# Patient Record
Sex: FEMALE | Race: WHITE | NOT HISPANIC OR LATINO | Employment: FULL TIME | URBAN - METROPOLITAN AREA
[De-identification: names, ages, dates, MRNs, and addresses within clinical notes are randomized per-mention and may not be internally consistent; named-entity substitution may affect disease eponyms.]

---

## 2018-08-20 ENCOUNTER — TRANSCRIBE ORDERS (OUTPATIENT)
Dept: ADMINISTRATIVE | Facility: HOSPITAL | Age: 43
End: 2018-08-20

## 2018-08-20 DIAGNOSIS — Z12.31 ENCOUNTER FOR SCREENING MAMMOGRAM FOR MALIGNANT NEOPLASM OF BREAST: Primary | ICD-10-CM

## 2018-08-27 ENCOUNTER — HOSPITAL ENCOUNTER (OUTPATIENT)
Dept: MAMMOGRAPHY | Facility: HOSPITAL | Age: 43
Discharge: HOME | End: 2018-08-27
Attending: SPECIALIST
Payer: COMMERCIAL

## 2018-08-27 DIAGNOSIS — Z12.31 ENCOUNTER FOR SCREENING MAMMOGRAM FOR MALIGNANT NEOPLASM OF BREAST: ICD-10-CM

## 2018-08-27 PROCEDURE — 77067 SCR MAMMO BI INCL CAD: CPT

## 2018-08-27 PROCEDURE — 77063 BREAST TOMOSYNTHESIS BI: CPT

## 2018-09-24 ENCOUNTER — TRANSCRIBE ORDERS (OUTPATIENT)
Dept: ADMINISTRATIVE | Facility: HOSPITAL | Age: 43
End: 2018-09-24

## 2018-09-24 DIAGNOSIS — R92.8 ABNORMAL MAMMOGRAM: Primary | ICD-10-CM

## 2018-10-09 ENCOUNTER — HOSPITAL ENCOUNTER (OUTPATIENT)
Dept: MAMMOGRAPHY | Facility: HOSPITAL | Age: 43
Discharge: HOME | End: 2018-10-09
Attending: SPECIALIST
Payer: COMMERCIAL

## 2018-10-09 DIAGNOSIS — R92.8 ABNORMAL MAMMOGRAM: ICD-10-CM

## 2018-10-09 PROCEDURE — G0279 TOMOSYNTHESIS, MAMMO: HCPCS

## 2018-10-09 PROCEDURE — 77065 DX MAMMO INCL CAD UNI: CPT

## 2020-06-04 ENCOUNTER — TRANSCRIBE ORDERS (OUTPATIENT)
Dept: ADMINISTRATIVE | Facility: HOSPITAL | Age: 45
End: 2020-06-04

## 2020-06-04 DIAGNOSIS — Z12.31 ENCOUNTER FOR SCREENING MAMMOGRAM FOR MALIGNANT NEOPLASM OF BREAST: Primary | ICD-10-CM

## 2020-06-09 ENCOUNTER — HOSPITAL ENCOUNTER (OUTPATIENT)
Dept: MAMMOGRAPHY | Facility: HOSPITAL | Age: 45
Discharge: HOME | End: 2020-06-09
Attending: SPECIALIST
Payer: COMMERCIAL

## 2020-06-09 VITALS — HEIGHT: 64 IN | BODY MASS INDEX: 29.88 KG/M2 | WEIGHT: 175 LBS

## 2020-06-09 DIAGNOSIS — Z12.31 ENCOUNTER FOR SCREENING MAMMOGRAM FOR MALIGNANT NEOPLASM OF BREAST: ICD-10-CM

## 2020-06-09 PROCEDURE — 77067 SCR MAMMO BI INCL CAD: CPT

## 2020-06-09 PROCEDURE — 77063 BREAST TOMOSYNTHESIS BI: CPT

## 2021-08-26 ENCOUNTER — HOSPITAL ENCOUNTER (OUTPATIENT)
Dept: MAMMOGRAPHY | Facility: HOSPITAL | Age: 46
Discharge: HOME | End: 2021-08-26
Attending: SPECIALIST
Payer: COMMERCIAL

## 2021-08-26 VITALS — HEIGHT: 64 IN | WEIGHT: 170 LBS | BODY MASS INDEX: 29.02 KG/M2

## 2021-08-26 DIAGNOSIS — Z12.31 ENCOUNTER FOR SCREENING MAMMOGRAM FOR MALIGNANT NEOPLASM OF BREAST: ICD-10-CM

## 2021-08-26 PROCEDURE — 77063 BREAST TOMOSYNTHESIS BI: CPT

## 2021-08-26 PROCEDURE — 77067 SCR MAMMO BI INCL CAD: CPT

## 2021-09-14 ENCOUNTER — HOSPITAL ENCOUNTER (OUTPATIENT)
Dept: ULTRASOUND IMAGING | Facility: HOSPITAL | Age: 46
Discharge: HOME | End: 2021-09-14
Attending: SPECIALIST
Payer: COMMERCIAL

## 2021-09-14 ENCOUNTER — HOSPITAL ENCOUNTER (OUTPATIENT)
Dept: MAMMOGRAPHY | Facility: HOSPITAL | Age: 46
Discharge: HOME | End: 2021-09-14
Attending: SPECIALIST
Payer: COMMERCIAL

## 2021-09-14 DIAGNOSIS — R92.8 ABNORMAL MAMMOGRAM: ICD-10-CM

## 2021-09-14 PROCEDURE — G0279 TOMOSYNTHESIS, MAMMO: HCPCS

## 2021-09-14 PROCEDURE — 77065 DX MAMMO INCL CAD UNI: CPT

## 2021-10-22 ENCOUNTER — APPOINTMENT (OUTPATIENT)
Dept: LAB | Facility: HOSPITAL | Age: 46
End: 2021-10-22
Attending: FAMILY MEDICINE
Payer: COMMERCIAL

## 2021-10-22 DIAGNOSIS — E78.5 HYPERLIPIDEMIA, UNSPECIFIED HYPERLIPIDEMIA TYPE: ICD-10-CM

## 2021-10-22 DIAGNOSIS — Z13.9 SCREENING FOR UNSPECIFIED CONDITION: ICD-10-CM

## 2021-10-22 LAB
25(OH)D3 SERPL-MCNC: 28.9 NG/ML (ref 30–100)
ALBUMIN SERPL BCP-MCNC: 3.9 G/DL (ref 3.5–5.7)
ALP SERPL-CCNC: 44 U/L (ref 40–150)
ALT SERPL W P-5'-P-CCNC: 10 U/L (ref 7–52)
ANION GAP SERPL CALCULATED.3IONS-SCNC: 10 MMOL/L (ref 4–13)
AST SERPL W P-5'-P-CCNC: 12 U/L (ref 13–39)
BILIRUB SERPL-MCNC: 0.6 MG/DL (ref 0.2–1)
BUN SERPL-MCNC: 9 MG/DL (ref 7–25)
CALCIUM SERPL-MCNC: 9.2 MG/DL (ref 8.6–10.5)
CHLORIDE SERPL-SCNC: 102 MMOL/L (ref 98–107)
CHOLEST SERPL-MCNC: 168 MG/DL (ref 0–200)
CO2 SERPL-SCNC: 25 MMOL/L (ref 21–31)
CREAT SERPL-MCNC: 0.73 MG/DL (ref 0.6–1.2)
ERYTHROCYTE [DISTWIDTH] IN BLOOD BY AUTOMATED COUNT: 11.8 % (ref 11.5–14.5)
GFR SERPL CREATININE-BSD FRML MDRD: 98 ML/MIN/1.73SQ M
GLUCOSE P FAST SERPL-MCNC: 86 MG/DL (ref 65–99)
HCT VFR BLD AUTO: 39.1 % (ref 42–47)
HDLC SERPL-MCNC: 75 MG/DL
HGB BLD-MCNC: 13.3 G/DL (ref 12–16)
LDLC SERPL CALC-MCNC: 68 MG/DL (ref 0–100)
MCH RBC QN AUTO: 34.1 PG (ref 26–34)
MCHC RBC AUTO-ENTMCNC: 34.1 G/DL (ref 31–37)
MCV RBC AUTO: 100 FL (ref 81–99)
NONHDLC SERPL-MCNC: 93 MG/DL
PLATELET # BLD AUTO: 289 THOUSANDS/UL (ref 149–390)
PMV BLD AUTO: 7.8 FL (ref 8.6–11.7)
POTASSIUM SERPL-SCNC: 4.2 MMOL/L (ref 3.5–5.5)
PROT SERPL-MCNC: 6.7 G/DL (ref 6.4–8.9)
RBC # BLD AUTO: 3.91 MILLION/UL (ref 3.9–5.2)
SODIUM SERPL-SCNC: 137 MMOL/L (ref 134–143)
TRIGL SERPL-MCNC: 123 MG/DL
WBC # BLD AUTO: 7.7 THOUSAND/UL (ref 4.8–10.8)

## 2021-10-22 PROCEDURE — 82306 VITAMIN D 25 HYDROXY: CPT

## 2021-10-22 PROCEDURE — 80061 LIPID PANEL: CPT

## 2021-10-22 PROCEDURE — 85027 COMPLETE CBC AUTOMATED: CPT

## 2021-10-22 PROCEDURE — 80053 COMPREHEN METABOLIC PANEL: CPT

## 2021-10-22 PROCEDURE — 36415 COLL VENOUS BLD VENIPUNCTURE: CPT

## 2022-12-08 ENCOUNTER — HOSPITAL ENCOUNTER (OUTPATIENT)
Dept: MAMMOGRAPHY | Facility: HOSPITAL | Age: 47
End: 2022-12-08
Attending: SPECIALIST

## 2022-12-08 VITALS — BODY MASS INDEX: 30.22 KG/M2 | WEIGHT: 177 LBS | HEIGHT: 64 IN

## 2022-12-08 DIAGNOSIS — Z12.31 ENCOUNTER FOR SCREENING MAMMOGRAM FOR MALIGNANT NEOPLASM OF BREAST: ICD-10-CM

## 2023-12-11 ENCOUNTER — HOSPITAL ENCOUNTER (OUTPATIENT)
Dept: MAMMOGRAPHY | Facility: HOSPITAL | Age: 48
Discharge: HOME | End: 2023-12-11
Attending: SPECIALIST
Payer: COMMERCIAL

## 2023-12-11 VITALS — BODY MASS INDEX: 30.22 KG/M2 | WEIGHT: 177 LBS | HEIGHT: 64 IN

## 2023-12-11 DIAGNOSIS — Z12.31 ENCOUNTER FOR SCREENING MAMMOGRAM FOR MALIGNANT NEOPLASM OF BREAST: ICD-10-CM

## 2023-12-11 PROCEDURE — 77067 SCR MAMMO BI INCL CAD: CPT

## 2023-12-11 PROCEDURE — 77063 BREAST TOMOSYNTHESIS BI: CPT

## 2023-12-22 ENCOUNTER — HOSPITAL ENCOUNTER (OUTPATIENT)
Dept: ULTRASOUND IMAGING | Facility: HOSPITAL | Age: 48
End: 2023-12-22
Attending: FAMILY MEDICINE
Payer: COMMERCIAL

## 2023-12-22 DIAGNOSIS — R10.9 ABDOMINAL PAIN, UNSPECIFIED ABDOMINAL LOCATION: ICD-10-CM

## 2023-12-22 PROCEDURE — 76705 ECHO EXAM OF ABDOMEN: CPT

## 2023-12-23 ENCOUNTER — APPOINTMENT (OUTPATIENT)
Dept: LAB | Facility: HOSPITAL | Age: 48
End: 2023-12-23
Payer: COMMERCIAL

## 2023-12-23 DIAGNOSIS — E78.5 HYPERLIPIDEMIA, UNSPECIFIED HYPERLIPIDEMIA TYPE: ICD-10-CM

## 2023-12-23 LAB
ALBUMIN SERPL BCP-MCNC: 3.7 G/DL (ref 3.5–5)
ALP SERPL-CCNC: 55 U/L (ref 34–104)
ALT SERPL W P-5'-P-CCNC: 13 U/L (ref 7–52)
ANION GAP SERPL CALCULATED.3IONS-SCNC: 11 MMOL/L
AST SERPL W P-5'-P-CCNC: 15 U/L (ref 13–39)
BILIRUB SERPL-MCNC: 0.7 MG/DL (ref 0.2–1)
BUN SERPL-MCNC: 12 MG/DL (ref 5–25)
CALCIUM SERPL-MCNC: 8.7 MG/DL (ref 8.4–10.2)
CHLORIDE SERPL-SCNC: 103 MMOL/L (ref 96–108)
CHOLEST SERPL-MCNC: 174 MG/DL
CO2 SERPL-SCNC: 22 MMOL/L (ref 21–32)
CREAT SERPL-MCNC: 0.77 MG/DL (ref 0.6–1.3)
ERYTHROCYTE [DISTWIDTH] IN BLOOD BY AUTOMATED COUNT: 11.4 % (ref 11.6–15.1)
GFR SERPL CREATININE-BSD FRML MDRD: 90 ML/MIN/1.73SQ M
GLUCOSE P FAST SERPL-MCNC: 75 MG/DL (ref 65–99)
HCT VFR BLD AUTO: 37 % (ref 34.8–46.1)
HDLC SERPL-MCNC: 62 MG/DL
HGB BLD-MCNC: 12.7 G/DL (ref 11.5–15.4)
LDLC SERPL CALC-MCNC: 79 MG/DL (ref 0–100)
MCH RBC QN AUTO: 34.4 PG (ref 26.8–34.3)
MCHC RBC AUTO-ENTMCNC: 34.3 G/DL (ref 31.4–37.4)
MCV RBC AUTO: 100 FL (ref 82–98)
NONHDLC SERPL-MCNC: 112 MG/DL
PLATELET # BLD AUTO: 293 THOUSANDS/UL (ref 149–390)
PMV BLD AUTO: 9.2 FL (ref 8.9–12.7)
POTASSIUM SERPL-SCNC: 3.8 MMOL/L (ref 3.5–5.3)
PROT SERPL-MCNC: 6.4 G/DL (ref 6.4–8.4)
RBC # BLD AUTO: 3.69 MILLION/UL (ref 3.81–5.12)
SODIUM SERPL-SCNC: 136 MMOL/L (ref 135–147)
TRIGL SERPL-MCNC: 166 MG/DL
WBC # BLD AUTO: 10.56 THOUSAND/UL (ref 4.31–10.16)

## 2023-12-23 PROCEDURE — 80053 COMPREHEN METABOLIC PANEL: CPT

## 2023-12-23 PROCEDURE — 36415 COLL VENOUS BLD VENIPUNCTURE: CPT

## 2023-12-23 PROCEDURE — 80061 LIPID PANEL: CPT

## 2023-12-23 PROCEDURE — 85027 COMPLETE CBC AUTOMATED: CPT

## 2024-01-11 ENCOUNTER — CONSULT (OUTPATIENT)
Dept: SURGERY | Facility: CLINIC | Age: 49
End: 2024-01-11
Payer: COMMERCIAL

## 2024-01-11 ENCOUNTER — CONSULT (OUTPATIENT)
Dept: GASTROENTEROLOGY | Facility: CLINIC | Age: 49
End: 2024-01-11
Payer: COMMERCIAL

## 2024-01-11 VITALS
OXYGEN SATURATION: 98 % | SYSTOLIC BLOOD PRESSURE: 122 MMHG | HEIGHT: 64 IN | DIASTOLIC BLOOD PRESSURE: 78 MMHG | BODY MASS INDEX: 32.85 KG/M2 | WEIGHT: 192.4 LBS | HEART RATE: 85 BPM | TEMPERATURE: 97.5 F | RESPIRATION RATE: 16 BRPM

## 2024-01-11 VITALS
RESPIRATION RATE: 18 BRPM | WEIGHT: 192 LBS | HEART RATE: 85 BPM | BODY MASS INDEX: 32.78 KG/M2 | HEIGHT: 64 IN | DIASTOLIC BLOOD PRESSURE: 78 MMHG | OXYGEN SATURATION: 98 % | SYSTOLIC BLOOD PRESSURE: 122 MMHG

## 2024-01-11 DIAGNOSIS — K80.20 GALLSTONE: Primary | ICD-10-CM

## 2024-01-11 DIAGNOSIS — K80.20 CALCULUS OF GALLBLADDER WITHOUT CHOLECYSTITIS WITHOUT OBSTRUCTION: ICD-10-CM

## 2024-01-11 DIAGNOSIS — R10.13 EPIGASTRIC PAIN: ICD-10-CM

## 2024-01-11 DIAGNOSIS — R11.0 NAUSEA: Primary | ICD-10-CM

## 2024-01-11 DIAGNOSIS — Z12.11 SCREENING FOR COLON CANCER: ICD-10-CM

## 2024-01-11 DIAGNOSIS — K76.0 HEPATIC STEATOSIS: ICD-10-CM

## 2024-01-11 DIAGNOSIS — R11.0 NAUSEA: ICD-10-CM

## 2024-01-11 PROCEDURE — 99204 OFFICE O/P NEW MOD 45 MIN: CPT | Performed by: PHYSICIAN ASSISTANT

## 2024-01-11 PROCEDURE — 99204 OFFICE O/P NEW MOD 45 MIN: CPT | Performed by: SURGERY

## 2024-01-11 RX ORDER — FAMOTIDINE 20 MG/1
20 TABLET, FILM COATED ORAL 2 TIMES DAILY
Qty: 60 TABLET | Refills: 5 | Status: SHIPPED | OUTPATIENT
Start: 2024-01-11

## 2024-01-11 RX ORDER — ATORVASTATIN CALCIUM 20 MG/1
TABLET, FILM COATED ORAL
COMMUNITY
Start: 2023-11-25

## 2024-01-11 RX ORDER — NORGESTIMATE AND ETHINYL ESTRADIOL 7DAYSX3 LO
KIT ORAL
COMMUNITY
Start: 2018-09-10

## 2024-01-11 NOTE — PROGRESS NOTES
[facility] - Outpatient Consultation  Philly Jeffrey 48 y.o. female MRN: [id number]  Encounter: [id number]    ASSESSMENT AND PLAN:      1. Nausea  2. Epigastric pain    This pt developed recurrent nausea over the past 6+ months without obvious precipitant.     She has a history of heartburn though her symptoms are quiescent at present.  She had an ultrasound which demonstrated incidental findings of gallstones.      We did review at this time differential diagnosis includes gastritis, H. pylori infection, gastroparesis, gout but dysbiosis, irritable bowel syndrome, functional dyspepsia, biliary pathology, though symptoms are not typical for biliary colic.    Reviewed given no improvement with PPI, recommend additional investigation with EGD now to evaluate for intra-luminal pathology.   Recommend diet and lifestyle modifications for GERD.   Recommend small frequent meals and avoiding saturated fats and difficult to digest raw fibrous foods.   Consider GES in f/u.     Risks associated with endoscopic evaluation discussed with patient today, including but not limited to bleeding, infection, perforation, and organ injury, all of which are low. Pt demonstrates understanding and is agreeable to the plan.     - Ambulatory Referral to Gastroenterology  - EGD; Future  - TSH w/Reflex; Future  - Tissue transglutaminase, IgA; Future  - IgA; Future  - famotidine (PEPCID) 20 mg tablet; Take 1 tablet (20 mg total) by mouth 2 (two) times a day  Dispense: 60 tablet; Refill: 5    3. Calculus of gallbladder without cholecystitis without obstruction    Patient had ultrasound completed for nausea which demonstrated incidental findings of gallstones.  She does not have any symptoms at present that are consistent with typical biliary dyskinesia.  Recommend a comprehensive GI evaluation and if unremarkable consider HIDA scan with CCK for additional evaluation. She may have underlying biliary dyskinesia  (?).    - Ambulatory Referral to Gastroenterology    4. Screening for colon cancer    Due for colonoscopy for cancer screening purposes.  Patient is of average risk, no first-degree relative with colorectal cancer.  No alarm features to the lower GI tract at this time.  MiraLAX Dulcolax Gatorade prep was prescribed today.    - Colonoscopy; Future    5. Hepatic steatosis    Noted, suspect combination of metabolic/NAFLD as well as a component of EtOH related as she does consume 1-2 beers daily.  Reviewed possible sequelae of disease.  Fibrosis score is low, F0 to F2.  Recommend cutting back on alcohol, slow sustained weight loss.  Consider elastography in the future.    Discussed recommendations in regards to fatty liver including:   Strict control of contributing comorbidities (obesity, prediabetes/diabetes, hypertension, and hypertriglyceridemia).  Weight loss of approx 10-15% of patient's current body weight over a period of 6-12 months through low fat diet and cardiovascular exercise as tolerated.  Limiting alcohol consumption, preferably complete abstinence.  Monitor hepatic function every 6 months with routine labs.     - Chronic Hepatitis Panel; Future  - Hepatitis A antibody, total; Future  - Hepatitis B surface antibody; Future    NAFLD Fibrosis Score is: -1.872    NAFLD Score Correlated Fibrosis Severity   <-1.455 F0-F2   -1.455-0.676 Indeterminate Score   >0.676 F3-F4   **Fibrosis Severity Scale: F0 = no fibrosis; F1= mild fibrosis; F2 = moderate fibrosis; F3 = severe fibrosis; F4 = cirrhosis    NAFLD Score Component Values:  Component Value Date   Age: 48 y.o.     BMI: 32.96 kg/m²    IFG or DM: No    AST: 15 U/L 12/23/2023   ALT: 13 U/L 12/23/2023   Platelet: 293 Thousands/uL 12/23/2023   Albumin: 3.7 g/dL 12/23/2023      Follow up after bidirectional endoscopic evaluation.  ______________________________________________________________________    HPI: Patient is a 48 y.o. female who presents today for a  consultation regarding nausea. Pmhx sig for hepatic steatosis, HLD.     Patient is new to clinic.  She shares that back in 12/2022 she developed epigastric pain and her PCP started her on PPI. This resolved symptoms and she d/c medication and was symptom free for about 6 months. She shares that in the summer of 2023 she started to develop episodic nausea with no obvious precipitants. No obvious infection, change in medication, fever, new supplements, dietary changes.     Pt shares that after onset of nausea, she tried PPI once again for 2-3 weeks and this did not help symptoms so she d/c. Pt shares nausea occurs daily, typically 2-3 times per day. Can last for minutes to several hours. Waxes and wanes. Thinks perhaps worse following fatty foods, otherwise difficult to identify specific triggers. She did have one bout of non-bloody emesis that lasted for approx 5 hours, thought this was 2/2 to GI bug. No hematemesis. Rare heartburn depending on oral intake. No dysphagia or odynophagia. No early satiety. No unintentional weight loss.  No new headache, vision change, vertigo.    Pertaining to her bowels, patient is having a formed brown bowel movement every other day or so.  This has been her pattern chronically.  No excess straining or sense of incomplete evacuation.  No abdominal pain or rectal pain related to defecation.  Denies any BRBPR or melena. No nocturnal BM. No family hx of CRC.     NSAIDs: none   Etoh: 1-2 cans euceda lite daily  Tobacco: none     12/2023: RUQ US: Cholelithiasis. No evidence of acute cholecystitis. Borderline hepatomegaly with diffuse fatty infiltration.  12/2023: Hb 12.7, , Plt 293, BUN 12, Cr 0.77, AST 15, ALT 13, ALP 55, albumin 3.7, t bili 0.70     Endoscopic history:   EGD: none   Colon: none     Review of Systems   Constitutional:  Negative for fever.   Gastrointestinal:  Positive for abdominal pain and nausea. Negative for constipation, diarrhea and vomiting.   Genitourinary:   "Negative for dysuria, frequency and hematuria.   Musculoskeletal:  Negative for arthralgias and myalgias.   Neurological:  Negative for headaches.   Otherwise Per HPI    Historical Information   Past Medical History:   Diagnosis Date    Breast mass 2011     Past Surgical History:   Procedure Laterality Date    BREAST CYST EXCISION Right 2011    benign     Social History   Social History     Substance and Sexual Activity   Alcohol Use Yes    Alcohol/week: 10.0 standard drinks of alcohol    Types: 10 Cans of beer per week     Social History     Substance and Sexual Activity   Drug Use Never     Social History     Tobacco Use   Smoking Status Former    Current packs/day: 0.00    Average packs/day: 0.5 packs/day for 16.5 years (8.3 ttl pk-yrs)    Types: Cigarettes    Start date: 1992    Quit date: 3/3/2009    Years since quittin.8   Smokeless Tobacco Never     Family History   Problem Relation Age of Onset    No Known Problems Mother     No Known Problems Father     No Known Problems Daughter     Breast cancer Maternal Grandmother 83    No Known Problems Maternal Grandfather     No Known Problems Paternal Grandmother     No Known Problems Paternal Grandfather     No Known Problems Maternal Aunt     No Known Problems Maternal Aunt     No Known Problems Maternal Aunt     No Known Problems Paternal Aunt     No Known Problems Paternal Aunt     BRCA2 Positive Neg Hx     BRCA2 Negative Neg Hx     BRCA1 Positive Neg Hx     Ovarian cancer Neg Hx     BRCA1 Negative Neg Hx     Breast cancer additional onset Neg Hx     BRCA 1/2 Neg Hx     Colon cancer Neg Hx     Endometrial cancer Neg Hx      Meds/Allergies       Current Outpatient Medications:     atorvastatin (LIPITOR) 20 mg tablet    famotidine (PEPCID) 20 mg tablet    norgestimate-ethinyl estradiol (Ortho Tri-Cyclen Lo) 0.18/0.215/0.25 MG-25 MCG per tablet    No Known Allergies    Objective     Blood pressure 122/78, pulse 85, resp. rate 18, height 5' 4\" " (1.626 m), weight 87.1 kg (192 lb), SpO2 98%. Body mass index is 32.96 kg/m².    Physical Exam  Vitals and nursing note reviewed.   Constitutional:       Appearance: Normal appearance.   HENT:      Head: Normocephalic and atraumatic.   Eyes:      General: No scleral icterus.     Conjunctiva/sclera: Conjunctivae normal.   Pulmonary:      Effort: Pulmonary effort is normal. No respiratory distress.   Abdominal:      General: Bowel sounds are normal. There is no distension.      Palpations: Abdomen is soft.      Tenderness: There is no abdominal tenderness. There is no guarding or rebound.   Musculoskeletal:      Right lower leg: No edema.      Left lower leg: No edema.   Skin:     General: Skin is warm and dry.      Coloration: Skin is not jaundiced.   Neurological:      General: No focal deficit present.      Mental Status: She is alert and oriented to person, place, and time.   Psychiatric:         Mood and Affect: Mood normal.         Behavior: Behavior normal.        Lab Results:   No visits with results within 1 Day(s) from this visit.   Latest known visit with results is:   Appointment on 12/23/2023   Component Date Value    WBC 12/23/2023 10.56 (H)     RBC 12/23/2023 3.69 (L)     Hemoglobin 12/23/2023 12.7     Hematocrit 12/23/2023 37.0     MCV 12/23/2023 100 (H)     MCH 12/23/2023 34.4 (H)     MCHC 12/23/2023 34.3     RDW 12/23/2023 11.4 (L)     Platelets 12/23/2023 293     MPV 12/23/2023 9.2     Sodium 12/23/2023 136     Potassium 12/23/2023 3.8     Chloride 12/23/2023 103     CO2 12/23/2023 22     ANION GAP 12/23/2023 11     BUN 12/23/2023 12     Creatinine 12/23/2023 0.77     Glucose, Fasting 12/23/2023 75     Calcium 12/23/2023 8.7     AST 12/23/2023 15     ALT 12/23/2023 13     Alkaline Phosphatase 12/23/2023 55     Total Protein 12/23/2023 6.4     Albumin 12/23/2023 3.7     Total Bilirubin 12/23/2023 0.70     eGFR 12/23/2023 90     Cholesterol 12/23/2023 174     Triglycerides 12/23/2023 166 (H)     HDL,  Direct 12/23/2023 62     LDL Calculated 12/23/2023 79     Non-HDL-Chol (CHOL-HDL) 12/23/2023 112      Radiology Results:   US right upper quadrant    Result Date: 1/1/2024  Narrative: RIGHT UPPER QUADRANT ULTRASOUND INDICATION:     R10.9: Unspecified abdominal pain. COMPARISON:  None TECHNIQUE:   Real-time ultrasound of the right upper quadrant was performed with a curvilinear transducer with both volumetric sweeps and still imaging techniques. FINDINGS: PANCREAS:  Visualized portions of the pancreas are within normal limits. AORTA AND IVC:  Visualized portions are normal for patient age. LIVER: Size: Borderline hepatomegaly.  The liver measures 17.4 cm in the midclavicular line. Contour:  Surface contour is smooth. Parenchyma:  There is mild diffuse increased echogenicity with smooth echotexture, without significant beam attenuation or loss of periportal echogenicity. No liver mass identified. Limited imaging of the main portal vein shows it to be patent and hepatopetal. BILIARY: The gallbladder is normal in caliber. No wall thickening or pericholecystic fluid. Shadowing gallstone(s) identified. No sonographic Cruz's sign. No intrahepatic biliary dilatation. CBD measures 4.0 mm. No choledocholithiasis. KIDNEY: Right kidney measures 10.0 x 3.6 x 4.1 cm. Volume 78.0 mL Kidney within normal limits. ASCITES:   None.     Impression: Cholelithiasis. No evidence of acute cholecystitis. Borderline hepatomegaly with diffuse fatty infiltration. Workstation performed: [id number]     Renee Bear PA-C    **Please note:  Dictation voice to text software may have been used in the creation of this record.  Occasional wrong word or “sound alike” substitutions may have occurred due to the inherent limitations of voice recognition software.  Read the chart carefully and recognize, using context, where substitutions have occurred.**

## 2024-01-11 NOTE — H&P (VIEW-ONLY)
[facility] - Outpatient Consultation  Philly Jeffrey 48 y.o. female MRN: [id number]  Encounter: [id number]    ASSESSMENT AND PLAN:      1. Nausea  2. Epigastric pain    This pt developed recurrent nausea over the past 6+ months without obvious precipitant.     She has a history of heartburn though her symptoms are quiescent at present.  She had an ultrasound which demonstrated incidental findings of gallstones.      We did review at this time differential diagnosis includes gastritis, H. pylori infection, gastroparesis, gout but dysbiosis, irritable bowel syndrome, functional dyspepsia, biliary pathology, though symptoms are not typical for biliary colic.    Reviewed given no improvement with PPI, recommend additional investigation with EGD now to evaluate for intra-luminal pathology.   Recommend diet and lifestyle modifications for GERD.   Recommend small frequent meals and avoiding saturated fats and difficult to digest raw fibrous foods.   Consider GES in f/u.     Risks associated with endoscopic evaluation discussed with patient today, including but not limited to bleeding, infection, perforation, and organ injury, all of which are low. Pt demonstrates understanding and is agreeable to the plan.     - Ambulatory Referral to Gastroenterology  - EGD; Future  - TSH w/Reflex; Future  - Tissue transglutaminase, IgA; Future  - IgA; Future  - famotidine (PEPCID) 20 mg tablet; Take 1 tablet (20 mg total) by mouth 2 (two) times a day  Dispense: 60 tablet; Refill: 5    3. Calculus of gallbladder without cholecystitis without obstruction    Patient had ultrasound completed for nausea which demonstrated incidental findings of gallstones.  She does not have any symptoms at present that are consistent with typical biliary dyskinesia.  Recommend a comprehensive GI evaluation and if unremarkable consider HIDA scan with CCK for additional evaluation. She may have underlying biliary dyskinesia  (?).    - Ambulatory Referral to Gastroenterology    4. Screening for colon cancer    Due for colonoscopy for cancer screening purposes.  Patient is of average risk, no first-degree relative with colorectal cancer.  No alarm features to the lower GI tract at this time.  MiraLAX Dulcolax Gatorade prep was prescribed today.    - Colonoscopy; Future    5. Hepatic steatosis    Noted, suspect combination of metabolic/NAFLD as well as a component of EtOH related as she does consume 1-2 beers daily.  Reviewed possible sequelae of disease.  Fibrosis score is low, F0 to F2.  Recommend cutting back on alcohol, slow sustained weight loss.  Consider elastography in the future.    Discussed recommendations in regards to fatty liver including:   Strict control of contributing comorbidities (obesity, prediabetes/diabetes, hypertension, and hypertriglyceridemia).  Weight loss of approx 10-15% of patient's current body weight over a period of 6-12 months through low fat diet and cardiovascular exercise as tolerated.  Limiting alcohol consumption, preferably complete abstinence.  Monitor hepatic function every 6 months with routine labs.     - Chronic Hepatitis Panel; Future  - Hepatitis A antibody, total; Future  - Hepatitis B surface antibody; Future    NAFLD Fibrosis Score is: -1.872    NAFLD Score Correlated Fibrosis Severity   <-1.455 F0-F2   -1.455-0.676 Indeterminate Score   >0.676 F3-F4   **Fibrosis Severity Scale: F0 = no fibrosis; F1= mild fibrosis; F2 = moderate fibrosis; F3 = severe fibrosis; F4 = cirrhosis    NAFLD Score Component Values:  Component Value Date   Age: 48 y.o.     BMI: 32.96 kg/m²    IFG or DM: No    AST: 15 U/L 12/23/2023   ALT: 13 U/L 12/23/2023   Platelet: 293 Thousands/uL 12/23/2023   Albumin: 3.7 g/dL 12/23/2023      Follow up after bidirectional endoscopic evaluation.  ______________________________________________________________________    HPI: Patient is a 48 y.o. female who presents today for a  consultation regarding nausea. Pmhx sig for hepatic steatosis, HLD.     Patient is new to clinic.  She shares that back in 12/2022 she developed epigastric pain and her PCP started her on PPI. This resolved symptoms and she d/c medication and was symptom free for about 6 months. She shares that in the summer of 2023 she started to develop episodic nausea with no obvious precipitants. No obvious infection, change in medication, fever, new supplements, dietary changes.     Pt shares that after onset of nausea, she tried PPI once again for 2-3 weeks and this did not help symptoms so she d/c. Pt shares nausea occurs daily, typically 2-3 times per day. Can last for minutes to several hours. Waxes and wanes. Thinks perhaps worse following fatty foods, otherwise difficult to identify specific triggers. She did have one bout of non-bloody emesis that lasted for approx 5 hours, thought this was 2/2 to GI bug. No hematemesis. Rare heartburn depending on oral intake. No dysphagia or odynophagia. No early satiety. No unintentional weight loss.  No new headache, vision change, vertigo.    Pertaining to her bowels, patient is having a formed brown bowel movement every other day or so.  This has been her pattern chronically.  No excess straining or sense of incomplete evacuation.  No abdominal pain or rectal pain related to defecation.  Denies any BRBPR or melena. No nocturnal BM. No family hx of CRC.     NSAIDs: none   Etoh: 1-2 cans euceda lite daily  Tobacco: none     12/2023: RUQ US: Cholelithiasis. No evidence of acute cholecystitis. Borderline hepatomegaly with diffuse fatty infiltration.  12/2023: Hb 12.7, , Plt 293, BUN 12, Cr 0.77, AST 15, ALT 13, ALP 55, albumin 3.7, t bili 0.70     Endoscopic history:   EGD: none   Colon: none     Review of Systems   Constitutional:  Negative for fever.   Gastrointestinal:  Positive for abdominal pain and nausea. Negative for constipation, diarrhea and vomiting.   Genitourinary:   "Negative for dysuria, frequency and hematuria.   Musculoskeletal:  Negative for arthralgias and myalgias.   Neurological:  Negative for headaches.   Otherwise Per HPI    Historical Information   Past Medical History:   Diagnosis Date    Breast mass 2011     Past Surgical History:   Procedure Laterality Date    BREAST CYST EXCISION Right 2011    benign     Social History   Social History     Substance and Sexual Activity   Alcohol Use Yes    Alcohol/week: 10.0 standard drinks of alcohol    Types: 10 Cans of beer per week     Social History     Substance and Sexual Activity   Drug Use Never     Social History     Tobacco Use   Smoking Status Former    Current packs/day: 0.00    Average packs/day: 0.5 packs/day for 16.5 years (8.3 ttl pk-yrs)    Types: Cigarettes    Start date: 1992    Quit date: 3/3/2009    Years since quittin.8   Smokeless Tobacco Never     Family History   Problem Relation Age of Onset    No Known Problems Mother     No Known Problems Father     No Known Problems Daughter     Breast cancer Maternal Grandmother 83    No Known Problems Maternal Grandfather     No Known Problems Paternal Grandmother     No Known Problems Paternal Grandfather     No Known Problems Maternal Aunt     No Known Problems Maternal Aunt     No Known Problems Maternal Aunt     No Known Problems Paternal Aunt     No Known Problems Paternal Aunt     BRCA2 Positive Neg Hx     BRCA2 Negative Neg Hx     BRCA1 Positive Neg Hx     Ovarian cancer Neg Hx     BRCA1 Negative Neg Hx     Breast cancer additional onset Neg Hx     BRCA 1/2 Neg Hx     Colon cancer Neg Hx     Endometrial cancer Neg Hx      Meds/Allergies       Current Outpatient Medications:     atorvastatin (LIPITOR) 20 mg tablet    famotidine (PEPCID) 20 mg tablet    norgestimate-ethinyl estradiol (Ortho Tri-Cyclen Lo) 0.18/0.215/0.25 MG-25 MCG per tablet    No Known Allergies    Objective     Blood pressure 122/78, pulse 85, resp. rate 18, height 5' 4\" " (1.626 m), weight 87.1 kg (192 lb), SpO2 98%. Body mass index is 32.96 kg/m².    Physical Exam  Vitals and nursing note reviewed.   Constitutional:       Appearance: Normal appearance.   HENT:      Head: Normocephalic and atraumatic.   Eyes:      General: No scleral icterus.     Conjunctiva/sclera: Conjunctivae normal.   Pulmonary:      Effort: Pulmonary effort is normal. No respiratory distress.   Abdominal:      General: Bowel sounds are normal. There is no distension.      Palpations: Abdomen is soft.      Tenderness: There is no abdominal tenderness. There is no guarding or rebound.   Musculoskeletal:      Right lower leg: No edema.      Left lower leg: No edema.   Skin:     General: Skin is warm and dry.      Coloration: Skin is not jaundiced.   Neurological:      General: No focal deficit present.      Mental Status: She is alert and oriented to person, place, and time.   Psychiatric:         Mood and Affect: Mood normal.         Behavior: Behavior normal.        Lab Results:   No visits with results within 1 Day(s) from this visit.   Latest known visit with results is:   Appointment on 12/23/2023   Component Date Value    WBC 12/23/2023 10.56 (H)     RBC 12/23/2023 3.69 (L)     Hemoglobin 12/23/2023 12.7     Hematocrit 12/23/2023 37.0     MCV 12/23/2023 100 (H)     MCH 12/23/2023 34.4 (H)     MCHC 12/23/2023 34.3     RDW 12/23/2023 11.4 (L)     Platelets 12/23/2023 293     MPV 12/23/2023 9.2     Sodium 12/23/2023 136     Potassium 12/23/2023 3.8     Chloride 12/23/2023 103     CO2 12/23/2023 22     ANION GAP 12/23/2023 11     BUN 12/23/2023 12     Creatinine 12/23/2023 0.77     Glucose, Fasting 12/23/2023 75     Calcium 12/23/2023 8.7     AST 12/23/2023 15     ALT 12/23/2023 13     Alkaline Phosphatase 12/23/2023 55     Total Protein 12/23/2023 6.4     Albumin 12/23/2023 3.7     Total Bilirubin 12/23/2023 0.70     eGFR 12/23/2023 90     Cholesterol 12/23/2023 174     Triglycerides 12/23/2023 166 (H)     HDL,  Direct 12/23/2023 62     LDL Calculated 12/23/2023 79     Non-HDL-Chol (CHOL-HDL) 12/23/2023 112      Radiology Results:   US right upper quadrant    Result Date: 1/1/2024  Narrative: RIGHT UPPER QUADRANT ULTRASOUND INDICATION:     R10.9: Unspecified abdominal pain. COMPARISON:  None TECHNIQUE:   Real-time ultrasound of the right upper quadrant was performed with a curvilinear transducer with both volumetric sweeps and still imaging techniques. FINDINGS: PANCREAS:  Visualized portions of the pancreas are within normal limits. AORTA AND IVC:  Visualized portions are normal for patient age. LIVER: Size: Borderline hepatomegaly.  The liver measures 17.4 cm in the midclavicular line. Contour:  Surface contour is smooth. Parenchyma:  There is mild diffuse increased echogenicity with smooth echotexture, without significant beam attenuation or loss of periportal echogenicity. No liver mass identified. Limited imaging of the main portal vein shows it to be patent and hepatopetal. BILIARY: The gallbladder is normal in caliber. No wall thickening or pericholecystic fluid. Shadowing gallstone(s) identified. No sonographic Cruz's sign. No intrahepatic biliary dilatation. CBD measures 4.0 mm. No choledocholithiasis. KIDNEY: Right kidney measures 10.0 x 3.6 x 4.1 cm. Volume 78.0 mL Kidney within normal limits. ASCITES:   None.     Impression: Cholelithiasis. No evidence of acute cholecystitis. Borderline hepatomegaly with diffuse fatty infiltration. Workstation performed: [id number]     Renee Bear PA-C    **Please note:  Dictation voice to text software may have been used in the creation of this record.  Occasional wrong word or “sound alike” substitutions may have occurred due to the inherent limitations of voice recognition software.  Read the chart carefully and recognize, using context, where substitutions have occurred.**

## 2024-01-11 NOTE — PROGRESS NOTES
Consult - General Surgery   Philly Jeffrey 48 y.o. female MRN: [id number]  Encounter: [id number]    Assessment/Plan     48F with 3-6 months of intermittent nausea, mostly postprandial, found to have gallstones and hepatic steatosis on US without signs of inflammation. No classic signs and symptoms of biliary colic based on history and physical. Patient does consume at least 1 alcoholic drink per day. Mentions no significant GERD, no improvement in nausea with 2-3 weeks of daily PPI use, so now has stopped taking it. I advised having the patient evaluated by our GI colleagues with consideration for EGD to further evaluate her symptoms and look for gastritis, duodenitis, h.pylori or other UGI pathology. Would also advise colonoscopy for colon cancer screening at the same time. Will defer to GI regarding additional appropriate workup. Should the patient remain symptomatic and without an obvious cause, would consider HIDA with CCK testing to evaluate for biliary dyskinesia as at times nausea can be one of the primary symptoms of that condition. At this point, I do not believe the patient to have true biliary colic as abdominal pain has not been an issue. Will continue to follow along with her GI evaluation and testing and remain available to reconsider cholecystectomy as appropriate. The patient is in agreement with our working plan. We have been able to schedule her with our GI team later today.      History of Present Illness   Chief Complaint   Patient presents with   • Calculus of bile     3-6 months of nausea, intermittent, mostly postprandial. One episode of 5 hours of vomiting, but usually no vomiting. No diarrhea, no constipation, no bloody or black stools. No abdominal pain. Nausea is worst after eating and after eating fatty foods but it has not decreased her appetite or led her to decrease PO intake. Tried PPI for 2-3 weeks without improvement and then stopped it. Drinks a beer each day, sometimes more  on the weekends, this is at the end of the day. Prior smoker, quit 15 years ago, denies heavy NSAID use. No prior abdominal surgeries. Mother has gastric polyps and gets EGD testing. Patient never had prior EGD/colonoscopy, was initially planning for screening colonoscopy this summer after turning 49.       Review of Systems   Constitutional: Negative.  Negative for activity change, appetite change and fever.   Gastrointestinal:  Positive for nausea and vomiting. Negative for abdominal distention, abdominal pain, blood in stool, constipation and diarrhea.   Genitourinary:  Negative for difficulty urinating.   All other systems reviewed and are negative.      Historical Information   Past Medical History:   Diagnosis Date   • Breast mass 2011     Past Surgical History:   Procedure Laterality Date   • BREAST CYST EXCISION Right 2011    benign     Social History   Social History     Substance and Sexual Activity   Alcohol Use Yes   • Alcohol/week: 10.0 standard drinks of alcohol   • Types: 10 Cans of beer per week     Social History     Substance and Sexual Activity   Drug Use Never     Social History     Tobacco Use   Smoking Status Former   • Current packs/day: 0.00   • Average packs/day: 0.5 packs/day for 16.5 years (8.3 ttl pk-yrs)   • Types: Cigarettes   • Start date: 1992   • Quit date: 3/3/2009   • Years since quittin.8   Smokeless Tobacco Never     Family History   Problem Relation Age of Onset   • No Known Problems Mother    • No Known Problems Father    • No Known Problems Daughter    • Breast cancer Maternal Grandmother 83   • No Known Problems Maternal Grandfather    • No Known Problems Paternal Grandmother    • No Known Problems Paternal Grandfather    • No Known Problems Maternal Aunt    • No Known Problems Maternal Aunt    • No Known Problems Maternal Aunt    • No Known Problems Paternal Aunt    • No Known Problems Paternal Aunt    • BRCA2 Positive Neg Hx    • BRCA2 Negative Neg Hx    •  "BRCA1 Positive Neg Hx    • Ovarian cancer Neg Hx    • BRCA1 Negative Neg Hx    • Breast cancer additional onset Neg Hx    • BRCA 1/2 Neg Hx    • Colon cancer Neg Hx    • Endometrial cancer Neg Hx        Meds/Allergies     Current Outpatient Medications:   •  atorvastatin (LIPITOR) 20 mg tablet, , Disp: , Rfl:   •  norgestimate-ethinyl estradiol (Ortho Tri-Cyclen Lo) 0.18/0.215/0.25 MG-25 MCG per tablet, , Disp: , Rfl:   No Known Allergies    The following portions of the patient's history were reviewed and updated as appropriate: allergies, current medications, past family history, past medical history, past social history, past surgical history, and problem list.    Objective   Current Vitals:   Blood pressure 122/78, pulse 85, temperature 97.5 °F (36.4 °C), temperature source Temporal, resp. rate 16, height 5' 4\" (1.626 m), weight 87.3 kg (192 lb 6.4 oz), SpO2 98%.    Physical Exam  Vitals reviewed.   Constitutional:       General: She is not in acute distress.     Appearance: She is not toxic-appearing.   HENT:      Head: Normocephalic.      Mouth/Throat:      Mouth: Mucous membranes are moist.   Eyes:      Pupils: Pupils are equal, round, and reactive to light.   Cardiovascular:      Rate and Rhythm: Normal rate.   Pulmonary:      Effort: Pulmonary effort is normal. No respiratory distress.   Abdominal:      General: Abdomen is flat. There is no distension.      Palpations: Abdomen is soft.      Tenderness: There is no abdominal tenderness. There is no guarding or rebound.   Musculoskeletal:         General: Normal range of motion.      Cervical back: Normal range of motion.   Skin:     General: Skin is warm.      Capillary Refill: Capillary refill takes less than 2 seconds.      Coloration: Skin is not jaundiced.   Neurological:      General: No focal deficit present.      Mental Status: She is alert.   Psychiatric:         Mood and Affect: Mood normal.     Signature:  Winter Richard MD  Date: 1/11/2024 " Time: 10:19 AM

## 2024-01-11 NOTE — PATIENT INSTRUCTIONS
For your nausea, the origin of the symptoms is not completely clear.  You do have gallstones however the symptoms you are experiencing are not typical for symptomatic gallstones.  I do think it is reasonable to look into the stomach and small bowel for possible abnormalities that may be causing her nausea.  There is also a possibility it is a motility or movement/emptying of the stomach problem that can cause nausea.  I would recommend small frequent meals throughout the day.  Things that are difficult to mechanically pass include saturated fats and raw fibrous foods.  You can try Pepcid 20 mg twice daily and see if this helps with your symptoms.  If you want something for nausea please let us know.    You are due for colonoscopy for cancer screening purposes.  We will complete the MiraLAX Dulcolax Gatorade bowel prep.  2 days before the procedure I would like you to take 3 doses of MiraLAX.  That is a 17 g capful mixed into 8 ounces of liquid.  The day before the procedure I would like you to do the traditional bowel prep instructions and be on a completely clear liquid diet.    You also have fatty liver.  The recommendations for management of this include controlling specific chronic conditions like high blood pressure, high cholesterol/triglycerides, and blood sugar issues.  We also do recommend slow sustained weight loss which can reverse fat deposits in the liver.  Would also recommend minimizing alcohol intake.

## 2024-01-26 ENCOUNTER — ANESTHESIA EVENT (OUTPATIENT)
Dept: GASTROENTEROLOGY | Facility: HOSPITAL | Age: 49
End: 2024-01-26

## 2024-01-26 ENCOUNTER — ANESTHESIA (OUTPATIENT)
Dept: GASTROENTEROLOGY | Facility: HOSPITAL | Age: 49
End: 2024-01-26

## 2024-01-26 ENCOUNTER — HOSPITAL ENCOUNTER (OUTPATIENT)
Dept: GASTROENTEROLOGY | Facility: HOSPITAL | Age: 49
Setting detail: OUTPATIENT SURGERY
End: 2024-01-26
Payer: COMMERCIAL

## 2024-01-26 VITALS
DIASTOLIC BLOOD PRESSURE: 63 MMHG | TEMPERATURE: 98.1 F | OXYGEN SATURATION: 95 % | SYSTOLIC BLOOD PRESSURE: 118 MMHG | HEART RATE: 71 BPM | RESPIRATION RATE: 18 BRPM

## 2024-01-26 DIAGNOSIS — R10.13 EPIGASTRIC PAIN: ICD-10-CM

## 2024-01-26 DIAGNOSIS — Z12.11 SCREENING FOR COLON CANCER: ICD-10-CM

## 2024-01-26 DIAGNOSIS — R11.0 NAUSEA: ICD-10-CM

## 2024-01-26 LAB
EXT PREGNANCY TEST URINE: NEGATIVE
EXT. CONTROL: NORMAL

## 2024-01-26 PROCEDURE — 88341 IMHCHEM/IMCYTCHM EA ADD ANTB: CPT | Performed by: STUDENT IN AN ORGANIZED HEALTH CARE EDUCATION/TRAINING PROGRAM

## 2024-01-26 PROCEDURE — 88305 TISSUE EXAM BY PATHOLOGIST: CPT | Performed by: STUDENT IN AN ORGANIZED HEALTH CARE EDUCATION/TRAINING PROGRAM

## 2024-01-26 PROCEDURE — 88342 IMHCHEM/IMCYTCHM 1ST ANTB: CPT | Performed by: STUDENT IN AN ORGANIZED HEALTH CARE EDUCATION/TRAINING PROGRAM

## 2024-01-26 PROCEDURE — 81025 URINE PREGNANCY TEST: CPT | Performed by: STUDENT IN AN ORGANIZED HEALTH CARE EDUCATION/TRAINING PROGRAM

## 2024-01-26 PROCEDURE — 45385 COLONOSCOPY W/LESION REMOVAL: CPT | Performed by: INTERNAL MEDICINE

## 2024-01-26 PROCEDURE — 43239 EGD BIOPSY SINGLE/MULTIPLE: CPT | Performed by: INTERNAL MEDICINE

## 2024-01-26 RX ORDER — LIDOCAINE HYDROCHLORIDE 20 MG/ML
INJECTION, SOLUTION EPIDURAL; INFILTRATION; INTRACAUDAL; PERINEURAL AS NEEDED
Status: DISCONTINUED | OUTPATIENT
Start: 2024-01-26 | End: 2024-01-26

## 2024-01-26 RX ORDER — SODIUM CHLORIDE, SODIUM LACTATE, POTASSIUM CHLORIDE, CALCIUM CHLORIDE 600; 310; 30; 20 MG/100ML; MG/100ML; MG/100ML; MG/100ML
125 INJECTION, SOLUTION INTRAVENOUS CONTINUOUS
Status: CANCELLED | OUTPATIENT
Start: 2024-01-26

## 2024-01-26 RX ORDER — OMEPRAZOLE 20 MG/1
40 CAPSULE, DELAYED RELEASE ORAL 2 TIMES DAILY
Qty: 120 CAPSULE | Refills: 1 | Status: SHIPPED | OUTPATIENT
Start: 2024-01-26 | End: 2024-03-26

## 2024-01-26 RX ORDER — PROPOFOL 10 MG/ML
INJECTION, EMULSION INTRAVENOUS CONTINUOUS PRN
Status: DISCONTINUED | OUTPATIENT
Start: 2024-01-26 | End: 2024-01-26

## 2024-01-26 RX ORDER — PROPOFOL 10 MG/ML
INJECTION, EMULSION INTRAVENOUS AS NEEDED
Status: DISCONTINUED | OUTPATIENT
Start: 2024-01-26 | End: 2024-01-26

## 2024-01-26 RX ORDER — SODIUM CHLORIDE, SODIUM LACTATE, POTASSIUM CHLORIDE, CALCIUM CHLORIDE 600; 310; 30; 20 MG/100ML; MG/100ML; MG/100ML; MG/100ML
125 INJECTION, SOLUTION INTRAVENOUS CONTINUOUS
Status: DISCONTINUED | OUTPATIENT
Start: 2024-01-26 | End: 2024-01-30 | Stop reason: HOSPADM

## 2024-01-26 RX ADMIN — SODIUM CHLORIDE, SODIUM LACTATE, POTASSIUM CHLORIDE, AND CALCIUM CHLORIDE 125 ML/HR: .6; .31; .03; .02 INJECTION, SOLUTION INTRAVENOUS at 10:29

## 2024-01-26 RX ADMIN — PROPOFOL 150 MG: 10 INJECTION, EMULSION INTRAVENOUS at 10:44

## 2024-01-26 RX ADMIN — LIDOCAINE HYDROCHLORIDE 100 MG: 20 INJECTION, SOLUTION EPIDURAL; INFILTRATION; INTRACAUDAL; PERINEURAL at 10:44

## 2024-01-26 RX ADMIN — PROPOFOL 50 MG: 10 INJECTION, EMULSION INTRAVENOUS at 10:46

## 2024-01-26 RX ADMIN — PROPOFOL 120 MCG/KG/MIN: 10 INJECTION, EMULSION INTRAVENOUS at 10:44

## 2024-01-26 NOTE — INTERVAL H&P NOTE
H&P reviewed. After examining the patient I find no changes in the patients condition since the H&P had been written.    Vitals:    01/26/24 1019   BP: 129/72   Pulse: 69   Resp: 18   Temp: 98.1 °F (36.7 °C)   SpO2: 100%

## 2024-01-26 NOTE — ANESTHESIA PREPROCEDURE EVALUATION
Procedure:  COLONOSCOPY  EGD    Relevant Problems   GI/HEPATIC   (+) Hepatic steatosis        Physical Exam    Airway    Mallampati score: I  TM Distance: >3 FB  Neck ROM: full     Dental   No notable dental hx     Cardiovascular      Pulmonary      Other Findings  post-pubertal.      Anesthesia Plan  ASA Score- 2     Anesthesia Type- IV sedation with anesthesia with ASA Monitors.         Additional Monitors:     Airway Plan:            Plan Factors-Exercise tolerance (METS): >4 METS.    Chart reviewed.   Existing labs reviewed. Patient summary reviewed.    Patient is not a current smoker.      There is medical exclusion for perioperative obstructive sleep apnea risk education.        Induction- intravenous.    Postoperative Plan-     Informed Consent- Anesthetic plan and risks discussed with patient.  I personally reviewed this patient with the CRNA. Discussed and agreed on the Anesthesia Plan with the CRNA..

## 2024-01-26 NOTE — ANESTHESIA POSTPROCEDURE EVALUATION
Post-Op Assessment Note    CV Status:  Stable  Pain Score: 0    Pain management: adequate       Mental Status:  Alert and awake   Hydration Status:  Euvolemic   PONV Controlled:  Controlled   Airway Patency:  Patent     Post Op Vitals Reviewed: Yes    No anethesia notable event occurred.    Staff: CRNA               BP   118/63   Temp 97   Pulse 75   Resp 15   SpO2 96

## 2024-02-01 PROCEDURE — 88342 IMHCHEM/IMCYTCHM 1ST ANTB: CPT | Performed by: STUDENT IN AN ORGANIZED HEALTH CARE EDUCATION/TRAINING PROGRAM

## 2024-02-01 PROCEDURE — 88305 TISSUE EXAM BY PATHOLOGIST: CPT | Performed by: STUDENT IN AN ORGANIZED HEALTH CARE EDUCATION/TRAINING PROGRAM

## 2024-02-01 PROCEDURE — 88341 IMHCHEM/IMCYTCHM EA ADD ANTB: CPT | Performed by: STUDENT IN AN ORGANIZED HEALTH CARE EDUCATION/TRAINING PROGRAM

## 2024-02-08 ENCOUNTER — OFFICE VISIT (OUTPATIENT)
Dept: SURGERY | Facility: CLINIC | Age: 49
End: 2024-02-08
Payer: COMMERCIAL

## 2024-02-08 ENCOUNTER — TELEPHONE (OUTPATIENT)
Age: 49
End: 2024-02-08

## 2024-02-08 VITALS
DIASTOLIC BLOOD PRESSURE: 82 MMHG | BODY MASS INDEX: 33.46 KG/M2 | RESPIRATION RATE: 16 BRPM | OXYGEN SATURATION: 99 % | WEIGHT: 196 LBS | TEMPERATURE: 97.5 F | HEART RATE: 80 BPM | HEIGHT: 64 IN | SYSTOLIC BLOOD PRESSURE: 120 MMHG

## 2024-02-08 DIAGNOSIS — K80.20 CALCULUS OF GALLBLADDER WITHOUT CHOLECYSTITIS WITHOUT OBSTRUCTION: ICD-10-CM

## 2024-02-08 DIAGNOSIS — R11.0 NAUSEA: Primary | ICD-10-CM

## 2024-02-08 DIAGNOSIS — K63.5 COLON POLYP: ICD-10-CM

## 2024-02-08 DIAGNOSIS — K29.70 GASTRITIS: ICD-10-CM

## 2024-02-08 DIAGNOSIS — K25.9 GASTRIC ULCER: ICD-10-CM

## 2024-02-08 PROCEDURE — 99213 OFFICE O/P EST LOW 20 MIN: CPT | Performed by: SURGERY

## 2024-02-08 NOTE — PROGRESS NOTES
Progress Note - General Surgery   Philly Jeffrey 48 y.o. female MRN: [id number]  Encounter: [id number]    Assessment/Plan     48F with 3-6 months of intermittent nausea, mostly postprandial, found to have gallstones and hepatic steatosis on US without signs of inflammation. No classic signs and symptoms of biliary colic based on history and physical. Recent EGD showed a gastric ulcer and some mild gastritis. Colonoscopy showed multiple polyps warranting a 3 year follow up. Reports, images, and pathology reviewed by me. She is on BID PPI and BID [id number] and continues to have nausea. I have mentioned that acid blocker therapy can take 4-6 weeks to have a clinical impact on symptoms.    She does not have GI follow up set up at this time. HIDA with CCK testing has been ordered to evaluate for biliary dyskinesia as a contributor to her nausea. I encouraged her to continue the PPI and H6gjoawos as prescribed. When the results are available, I will discuss them with our GI team in order to determine next steps in her care.     Subjective       Chief Complaint   Patient presents with    Follow-up      Had GI evaluation, EGD, colonoscopy. EGD showed small gastric ulcer, gastritis, colonoscopy showed colon polyps requiring 3 year follow up. Has been taking BID PPI and BID [id number] since the procedures 2 weeks ago. Still with nausea, here in follow up wanting to have further evaluation for her gallbladder.        Review of Systems   Constitutional:  Negative for fever.   Gastrointestinal:  Positive for nausea. Negative for abdominal pain.   All other systems reviewed and are negative.      The following portions of the patient's history were reviewed and updated as appropriate: allergies, current medications, past family history, past medical history, past social history, past surgical history, and problem list.    Objective      Blood pressure 120/82, pulse 80, temperature 97.5 °F (36.4 °C), temperature source  "Temporal, resp. rate 16, height 5' 4\" (1.626 m), weight 88.9 kg (196 lb), last menstrual period 01/04/2024, SpO2 99%.   Physical Exam  Vitals reviewed.   Constitutional:       General: She is not in acute distress.     Appearance: She is not toxic-appearing.   HENT:      Head: Normocephalic.      Mouth/Throat:      Mouth: Mucous membranes are moist.   Eyes:      Pupils: Pupils are equal, round, and reactive to light.   Cardiovascular:      Rate and Rhythm: Normal rate.   Pulmonary:      Effort: Pulmonary effort is normal. No respiratory distress.   Abdominal:      General: Abdomen is flat. There is no distension.      Palpations: Abdomen is soft.      Tenderness: There is no abdominal tenderness. There is no guarding or rebound.   Musculoskeletal:         General: Normal range of motion.      Cervical back: Normal range of motion.   Skin:     General: Skin is warm and dry.      Capillary Refill: Capillary refill takes less than 2 seconds.      Coloration: Skin is not jaundiced.   Neurological:      General: No focal deficit present.      Mental Status: She is alert.   Psychiatric:         Mood and Affect: Mood normal.         Signature:  Winter Richard MD  Date: 2/8/2024 Time: 8:24 AM   "

## 2024-02-08 NOTE — TELEPHONE ENCOUNTER
----- Message from Renee Bear PA-C sent at 2/8/2024  8:31 AM EST -----  Pt should have f/u to discuss EGD/colon/fatty liver. Not sure why nothing was scheduled at time of last appt.

## 2024-02-14 ENCOUNTER — HOSPITAL ENCOUNTER (OUTPATIENT)
Dept: NUCLEAR MEDICINE | Facility: HOSPITAL | Age: 49
Discharge: HOME | End: 2024-02-14
Attending: SURGERY
Payer: COMMERCIAL

## 2024-02-14 DIAGNOSIS — R11.0 NAUSEA: ICD-10-CM

## 2024-02-14 PROCEDURE — 78227 HEPATOBIL SYST IMAGE W/DRUG: CPT

## 2024-02-14 PROCEDURE — A9537 TC99M MEBROFENIN: HCPCS

## 2024-02-14 PROCEDURE — G1004 CDSM NDSC: HCPCS

## 2024-02-14 RX ORDER — SINCALIDE 5 UG/5ML
0.02 INJECTION, POWDER, LYOPHILIZED, FOR SOLUTION INTRAVENOUS
Status: COMPLETED | OUTPATIENT
Start: 2024-02-14 | End: 2024-02-14

## 2024-02-14 RX ADMIN — SINCALIDE 1.8 MCG: 5 INJECTION, POWDER, LYOPHILIZED, FOR SOLUTION INTRAVENOUS at 13:00

## 2024-02-14 NOTE — RESULT ENCOUNTER NOTE
Please update the patient that her HIDA testing was notable for a low gallbladder ejection fraction (13%). Normal is often above 38%. Please ask her how she felt during the test, I see in the notes she was having symptoms at the time. I would recommend arranging another visit with me to discuss these results and the role of cholecystectomy with this condition.     Maia and Renee - any comment on the enterogastric reflux of bile part of things and anything notable with that from your perspective? Thanks.

## 2024-02-16 ENCOUNTER — TELEPHONE (OUTPATIENT)
Age: 49
End: 2024-02-16

## 2024-02-16 NOTE — TELEPHONE ENCOUNTER
Patient calling and asking for a call back to discuss results from testing that she had done on 2/14 and also asking what next steps would be

## 2024-02-19 NOTE — TELEPHONE ENCOUNTER
"\"Please update the patient that her HIDA testing was notable for a low gallbladder ejection fraction (13%). Normal is often above 38%. Please ask her how she felt during the test, I see in the notes she was having symptoms at the time. I would recommend arranging another visit with me to discuss these results and the role of cholecystectomy with this condition.\"  "

## 2024-02-21 ENCOUNTER — OFFICE VISIT (OUTPATIENT)
Dept: SURGERY | Facility: CLINIC | Age: 49
End: 2024-02-21
Payer: COMMERCIAL

## 2024-02-21 VITALS
DIASTOLIC BLOOD PRESSURE: 82 MMHG | BODY MASS INDEX: 32.88 KG/M2 | TEMPERATURE: 97.6 F | RESPIRATION RATE: 16 BRPM | SYSTOLIC BLOOD PRESSURE: 128 MMHG | OXYGEN SATURATION: 98 % | WEIGHT: 192.6 LBS | HEART RATE: 89 BPM | HEIGHT: 64 IN

## 2024-02-21 DIAGNOSIS — K80.20 CALCULUS OF GALLBLADDER WITHOUT CHOLECYSTITIS WITHOUT OBSTRUCTION: ICD-10-CM

## 2024-02-21 DIAGNOSIS — R11.0 NAUSEA: ICD-10-CM

## 2024-02-21 DIAGNOSIS — K25.9 GASTRIC ULCER: ICD-10-CM

## 2024-02-21 DIAGNOSIS — K82.8 BILIARY DYSKINESIA: Primary | ICD-10-CM

## 2024-02-21 PROCEDURE — 99213 OFFICE O/P EST LOW 20 MIN: CPT | Performed by: SURGERY

## 2024-02-21 RX ORDER — CHLORHEXIDINE GLUCONATE 4 G/100ML
SOLUTION TOPICAL DAILY PRN
OUTPATIENT
Start: 2024-02-21

## 2024-02-21 RX ORDER — SODIUM CHLORIDE, SODIUM LACTATE, POTASSIUM CHLORIDE, CALCIUM CHLORIDE 600; 310; 30; 20 MG/100ML; MG/100ML; MG/100ML; MG/100ML
125 INJECTION, SOLUTION INTRAVENOUS CONTINUOUS
OUTPATIENT
Start: 2024-02-21

## 2024-02-21 RX ORDER — HEPARIN SODIUM 5000 [USP'U]/ML
5000 INJECTION, SOLUTION INTRAVENOUS; SUBCUTANEOUS ONCE
OUTPATIENT
Start: 2024-02-21 | End: 2024-02-21

## 2024-02-21 NOTE — PROGRESS NOTES
Progress Note - General Surgery   Philly Jeffrey 48 y.o. female MRN: [id number]  Encounter: [id number]    Assessment/Plan     48F with 3-6 months of intermittent nausea, mostly postprandial, found to have gallstones and hepatic steatosis on US without signs of inflammation. No classic signs and symptoms of biliary colic based on history and physical. Recent EGD showed a gastric ulcer and some mild gastritis. Colonoscopy showed multiple polyps warranting a 3 year follow up. Reports, images, and pathology reviewed by me. She is on BID PPI and BID [id number] and continues to have nausea.     We obtained a HIDA with CCK testing since she was last seen which shows a reduced gallbladder ejection fraction of 13% with reproduction of symptoms during the test which is consistent with biliary dyskinesia. We discussed this diagnosis at length and the pros and cons of pursuing with laparoscopic cholecystectomy. She would like to proceed and I am in agreement with this. She understands that there is a chance that she will have residual GI symptoms postoperatively and that some patients are entirely cured of their symptoms with cholecystectomy. She knows that the medication treatment for her gastritis and ulcer should continue even if her symptoms are resolved after cholecystectomy per GI recommendations.     All questions answered, informed consent obtained. She has had no prior abdominal surgeries. No preop testing or clearance needed. I encouraged her to continue the PPI and [id number] as prescribed. We will make sure that she has GI follow up after she is seen postoperatively.    Subjective       Chief Complaint   Patient presents with    Pre-op Exam      Still with nausea, no change in symptoms. Has been compliant with PPI and H2B. Had symptoms during the HIDA. HIDA with CCK showed EF of 13% consistent with biliary dyskinesia, patient would like to proceed with surgery.       Review of Systems   Constitutional: Negative.  "   Gastrointestinal:  Positive for nausea.   All other systems reviewed and are negative.      The following portions of the patient's history were reviewed and updated as appropriate: allergies, current medications, past family history, past medical history, past social history, past surgical history, and problem list.    Objective      Blood pressure 128/82, pulse 89, temperature 97.6 °F (36.4 °C), temperature source Temporal, resp. rate 16, height 5' 4\" (1.626 m), weight 87.4 kg (192 lb 9.6 oz), last menstrual period 01/04/2024, SpO2 98%.   Physical Exam  Vitals reviewed.   Constitutional:       General: She is not in acute distress.     Appearance: She is not toxic-appearing.   HENT:      Head: Normocephalic.      Mouth/Throat:      Mouth: Mucous membranes are moist.   Eyes:      Pupils: Pupils are equal, round, and reactive to light.   Cardiovascular:      Rate and Rhythm: Normal rate.   Pulmonary:      Effort: Pulmonary effort is normal.   Abdominal:      General: Abdomen is flat. There is no distension.      Palpations: Abdomen is soft. There is no mass.      Tenderness: There is no abdominal tenderness. There is no guarding or rebound.   Musculoskeletal:         General: Normal range of motion.      Cervical back: Normal range of motion.   Skin:     General: Skin is warm and dry.      Capillary Refill: Capillary refill takes less than 2 seconds.      Coloration: Skin is not jaundiced.   Neurological:      General: No focal deficit present.      Mental Status: She is alert.   Psychiatric:         Mood and Affect: Mood normal.         Signature:  Winter Richard MD  Date: 2/21/2024 Time: 11:00 AM   "

## 2024-02-26 ENCOUNTER — ANESTHESIA EVENT (OUTPATIENT)
Dept: PERIOP | Facility: HOSPITAL | Age: 49
End: 2024-02-26
Payer: COMMERCIAL

## 2024-03-01 RX ORDER — FAMOTIDINE 20 MG
TABLET ORAL
COMMUNITY

## 2024-03-01 RX ORDER — ASCORBATE CALCIUM 500 MG
TABLET ORAL
COMMUNITY

## 2024-03-01 NOTE — PRE-PROCEDURE INSTRUCTIONS
Pre-Surgery Instructions:   Medication Instructions    atorvastatin (LIPITOR) 20 mg tablet Take day of surgery.    Bioflavonoid Products (Vitamin C) CHEW Stop taking 7 days prior to surgery.    famotidine (PEPCID) 20 mg tablet Take day of surgery.    norgestimate-ethinyl estradiol (Ortho Tri-Cyclen Lo) 0.18/0.215/0.25 MG-25 MCG per tablet Take day of surgery.    omeprazole (PriLOSEC) 20 mg delayed release capsule Take day of surgery.    Vitamin D, Cholecalciferol, 25 MCG (1000 UT) CAPS Stop taking 7 days prior to surgery.    Vitamin E 100 units TABS Stop taking 7 days prior to surgery.   Medication instructions for day surgery reviewed. Please use only a sip of water to take your instructed medications. Avoid all over the counter vitamins, supplements and NSAIDS for one week prior to surgery per anesthesia guidelines. Tylenol is ok to take as needed.     You will receive a call one business day prior to surgery with an arrival time and hospital directions. If your surgery is scheduled on a Monday, the hospital will be calling you on the Friday prior to your surgery. If you have not heard from anyone by 8pm, please call the hospital supervisor through the hospital  at [phone]. ([place] [phone] or [place] [phone]).    Do not eat or drink anything after midnight the night before your surgery, including candy, mints, lifesavers, or chewing gum. Do not drink alcohol 24hrs before your surgery. Try not to smoke at least 24hrs before your surgery.       Follow the pre surgery showering instructions as listed in the “My Surgical Experience Booklet” or otherwise provided by your surgeon's office. Do not use a blade to shave the surgical area 1 week before surgery. It is okay to use a clean electric clippers up to 24 hours before surgery. Do not apply any lotions, creams, including makeup, cologne, deodorant, or perfumes after showering on the day of your surgery. Do not use dry shampoo, hair spray,  hair gel, or any type of hair products.     No contact lenses, eye make-up, or artificial eyelashes. Remove nail polish, including gel polish, and any artificial, gel, or acrylic nails if possible. Remove all jewelry including rings and body piercing jewelry.     Wear causal clothing that is easy to take on and off. Consider your type of surgery.    Keep any valuables, jewelry, piercings at home. Please bring any specially ordered equipment (sling, braces) if indicated.    Arrange for a responsible person to drive you to and from the hospital on the day of your surgery. Please confirm the visitor policy for the day of your procedure when you receive your phone call with an arrival time.     Call the surgeon's office with any new illnesses, exposures, or additional questions prior to surgery.    Please reference your “My Surgical Experience Booklet” for additional information to prepare for your upcoming surgery.

## 2024-03-08 ENCOUNTER — ANESTHESIA (OUTPATIENT)
Dept: PERIOP | Facility: HOSPITAL | Age: 49
End: 2024-03-08
Payer: COMMERCIAL

## 2024-03-08 ENCOUNTER — HOSPITAL ENCOUNTER (OUTPATIENT)
Facility: HOSPITAL | Age: 49
Setting detail: OUTPATIENT SURGERY
Discharge: HOME | End: 2024-03-08
Attending: SURGERY | Admitting: SURGERY
Payer: COMMERCIAL

## 2024-03-08 VITALS
DIASTOLIC BLOOD PRESSURE: 68 MMHG | OXYGEN SATURATION: 99 % | WEIGHT: 192 LBS | RESPIRATION RATE: 18 BRPM | SYSTOLIC BLOOD PRESSURE: 118 MMHG | TEMPERATURE: 97.1 F | BODY MASS INDEX: 35.33 KG/M2 | HEIGHT: 62 IN | HEART RATE: 64 BPM

## 2024-03-08 DIAGNOSIS — K82.8 BILIARY DYSKINESIA: Primary | ICD-10-CM

## 2024-03-08 PROBLEM — E66.9 OBESITY, CLASS I, BMI 30-34.9: Status: ACTIVE | Noted: 2024-03-08

## 2024-03-08 PROBLEM — E66.811 OBESITY, CLASS I, BMI 30-34.9: Status: ACTIVE | Noted: 2024-03-08

## 2024-03-08 LAB
EXT PREGNANCY TEST URINE: NEGATIVE
EXT. CONTROL: NORMAL

## 2024-03-08 PROCEDURE — 81025 URINE PREGNANCY TEST: CPT | Performed by: ANESTHESIOLOGY

## 2024-03-08 PROCEDURE — NC001 PR NO CHARGE: Performed by: SURGERY

## 2024-03-08 PROCEDURE — 47562 LAPAROSCOPIC CHOLECYSTECTOMY: CPT

## 2024-03-08 PROCEDURE — 88304 TISSUE EXAM BY PATHOLOGIST: CPT | Performed by: STUDENT IN AN ORGANIZED HEALTH CARE EDUCATION/TRAINING PROGRAM

## 2024-03-08 PROCEDURE — 47562 LAPAROSCOPIC CHOLECYSTECTOMY: CPT | Performed by: SURGERY

## 2024-03-08 RX ORDER — TRAMADOL HYDROCHLORIDE 50 MG/1
50 TABLET ORAL EVERY 6 HOURS PRN
Qty: 12 TABLET | Refills: 0 | Status: SHIPPED | OUTPATIENT
Start: 2024-03-08

## 2024-03-08 RX ORDER — CHLORHEXIDINE GLUCONATE 4 G/100ML
SOLUTION TOPICAL DAILY PRN
Status: DISCONTINUED | OUTPATIENT
Start: 2024-03-08 | End: 2024-03-08 | Stop reason: HOSPADM

## 2024-03-08 RX ORDER — HYDROMORPHONE HCL/PF 1 MG/ML
0.5 SYRINGE (ML) INJECTION
Status: DISCONTINUED | OUTPATIENT
Start: 2024-03-08 | End: 2024-03-08 | Stop reason: HOSPADM

## 2024-03-08 RX ORDER — OXYCODONE HYDROCHLORIDE 5 MG/1
5 TABLET ORAL EVERY 4 HOURS PRN
Status: DISCONTINUED | OUTPATIENT
Start: 2024-03-08 | End: 2024-03-08 | Stop reason: HOSPADM

## 2024-03-08 RX ORDER — SODIUM CHLORIDE, SODIUM LACTATE, POTASSIUM CHLORIDE, CALCIUM CHLORIDE 600; 310; 30; 20 MG/100ML; MG/100ML; MG/100ML; MG/100ML
50 INJECTION, SOLUTION INTRAVENOUS CONTINUOUS
Status: DISCONTINUED | OUTPATIENT
Start: 2024-03-08 | End: 2024-03-08 | Stop reason: HOSPADM

## 2024-03-08 RX ORDER — PROPOFOL 10 MG/ML
INJECTION, EMULSION INTRAVENOUS AS NEEDED
Status: DISCONTINUED | OUTPATIENT
Start: 2024-03-08 | End: 2024-03-08

## 2024-03-08 RX ORDER — ROCURONIUM BROMIDE 10 MG/ML
INJECTION, SOLUTION INTRAVENOUS AS NEEDED
Status: DISCONTINUED | OUTPATIENT
Start: 2024-03-08 | End: 2024-03-08

## 2024-03-08 RX ORDER — CEFAZOLIN SODIUM 2 G/50ML
2000 SOLUTION INTRAVENOUS ONCE
Status: COMPLETED | OUTPATIENT
Start: 2024-03-08 | End: 2024-03-08

## 2024-03-08 RX ORDER — MIDAZOLAM HYDROCHLORIDE 2 MG/2ML
INJECTION, SOLUTION INTRAMUSCULAR; INTRAVENOUS AS NEEDED
Status: DISCONTINUED | OUTPATIENT
Start: 2024-03-08 | End: 2024-03-08

## 2024-03-08 RX ORDER — SODIUM CHLORIDE, SODIUM LACTATE, POTASSIUM CHLORIDE, CALCIUM CHLORIDE 600; 310; 30; 20 MG/100ML; MG/100ML; MG/100ML; MG/100ML
125 INJECTION, SOLUTION INTRAVENOUS CONTINUOUS
Status: DISCONTINUED | OUTPATIENT
Start: 2024-03-08 | End: 2024-03-08 | Stop reason: HOSPADM

## 2024-03-08 RX ORDER — FENTANYL CITRATE 50 UG/ML
INJECTION, SOLUTION INTRAMUSCULAR; INTRAVENOUS AS NEEDED
Status: DISCONTINUED | OUTPATIENT
Start: 2024-03-08 | End: 2024-03-08

## 2024-03-08 RX ORDER — HYDROMORPHONE HCL/PF 1 MG/ML
SYRINGE (ML) INJECTION AS NEEDED
Status: DISCONTINUED | OUTPATIENT
Start: 2024-03-08 | End: 2024-03-08

## 2024-03-08 RX ORDER — ALBUTEROL SULFATE 2.5 MG/3ML
2.5 SOLUTION RESPIRATORY (INHALATION) ONCE AS NEEDED
Status: DISCONTINUED | OUTPATIENT
Start: 2024-03-08 | End: 2024-03-08 | Stop reason: HOSPADM

## 2024-03-08 RX ORDER — ONDANSETRON 2 MG/ML
4 INJECTION INTRAMUSCULAR; INTRAVENOUS ONCE AS NEEDED
Status: DISCONTINUED | OUTPATIENT
Start: 2024-03-08 | End: 2024-03-08 | Stop reason: HOSPADM

## 2024-03-08 RX ORDER — LIDOCAINE HYDROCHLORIDE 20 MG/ML
INJECTION, SOLUTION EPIDURAL; INFILTRATION; INTRACAUDAL; PERINEURAL AS NEEDED
Status: DISCONTINUED | OUTPATIENT
Start: 2024-03-08 | End: 2024-03-08

## 2024-03-08 RX ORDER — LIDOCAINE HYDROCHLORIDE 10 MG/ML
0.5 INJECTION, SOLUTION EPIDURAL; INFILTRATION; INTRACAUDAL; PERINEURAL ONCE AS NEEDED
Status: DISCONTINUED | OUTPATIENT
Start: 2024-03-08 | End: 2024-03-08 | Stop reason: HOSPADM

## 2024-03-08 RX ORDER — FENTANYL CITRATE/PF 50 MCG/ML
50 SYRINGE (ML) INJECTION
Status: DISCONTINUED | OUTPATIENT
Start: 2024-03-08 | End: 2024-03-08 | Stop reason: HOSPADM

## 2024-03-08 RX ORDER — ACETAMINOPHEN 325 MG/1
650 TABLET ORAL EVERY 6 HOURS PRN
Status: DISCONTINUED | OUTPATIENT
Start: 2024-03-08 | End: 2024-03-08 | Stop reason: HOSPADM

## 2024-03-08 RX ORDER — ONDANSETRON 2 MG/ML
INJECTION INTRAMUSCULAR; INTRAVENOUS AS NEEDED
Status: DISCONTINUED | OUTPATIENT
Start: 2024-03-08 | End: 2024-03-08

## 2024-03-08 RX ORDER — BUPIVACAINE HYDROCHLORIDE 5 MG/ML
INJECTION, SOLUTION EPIDURAL; INTRACAUDAL AS NEEDED
Status: DISCONTINUED | OUTPATIENT
Start: 2024-03-08 | End: 2024-03-08 | Stop reason: HOSPADM

## 2024-03-08 RX ORDER — HEPARIN SODIUM 5000 [USP'U]/ML
5000 INJECTION, SOLUTION INTRAVENOUS; SUBCUTANEOUS ONCE
Status: COMPLETED | OUTPATIENT
Start: 2024-03-08 | End: 2024-03-08

## 2024-03-08 RX ORDER — PROMETHAZINE HYDROCHLORIDE 25 MG/ML
12.5 INJECTION, SOLUTION INTRAMUSCULAR; INTRAVENOUS ONCE AS NEEDED
Status: DISCONTINUED | OUTPATIENT
Start: 2024-03-08 | End: 2024-03-08 | Stop reason: HOSPADM

## 2024-03-08 RX ORDER — DEXAMETHASONE SODIUM PHOSPHATE 10 MG/ML
INJECTION, SOLUTION INTRAMUSCULAR; INTRAVENOUS AS NEEDED
Status: DISCONTINUED | OUTPATIENT
Start: 2024-03-08 | End: 2024-03-08

## 2024-03-08 RX ORDER — KETOROLAC TROMETHAMINE 30 MG/ML
INJECTION, SOLUTION INTRAMUSCULAR; INTRAVENOUS AS NEEDED
Status: DISCONTINUED | OUTPATIENT
Start: 2024-03-08 | End: 2024-03-08

## 2024-03-08 RX ORDER — MAGNESIUM HYDROXIDE 1200 MG/15ML
LIQUID ORAL AS NEEDED
Status: DISCONTINUED | OUTPATIENT
Start: 2024-03-08 | End: 2024-03-08 | Stop reason: HOSPADM

## 2024-03-08 RX ADMIN — PROPOFOL 60 MCG/KG/MIN: 10 INJECTION, EMULSION INTRAVENOUS at 08:40

## 2024-03-08 RX ADMIN — SUGAMMADEX 200 MG: 100 INJECTION, SOLUTION INTRAVENOUS at 09:41

## 2024-03-08 RX ADMIN — ROCURONIUM BROMIDE 20 MG: 10 INJECTION, SOLUTION INTRAVENOUS at 09:05

## 2024-03-08 RX ADMIN — ROCURONIUM BROMIDE 50 MG: 10 INJECTION, SOLUTION INTRAVENOUS at 08:38

## 2024-03-08 RX ADMIN — KETOROLAC TROMETHAMINE 15 MG: 30 INJECTION, SOLUTION INTRAMUSCULAR; INTRAVENOUS at 09:32

## 2024-03-08 RX ADMIN — MIDAZOLAM 2 MG: 1 INJECTION INTRAMUSCULAR; INTRAVENOUS at 08:32

## 2024-03-08 RX ADMIN — DEXAMETHASONE SODIUM PHOSPHATE 10 MG: 10 INJECTION, SOLUTION INTRAMUSCULAR; INTRAVENOUS at 08:38

## 2024-03-08 RX ADMIN — FENTANYL CITRATE 50 MCG: 50 INJECTION INTRAMUSCULAR; INTRAVENOUS at 08:52

## 2024-03-08 RX ADMIN — HEPARIN SODIUM 5000 UNITS: 5000 INJECTION, SOLUTION INTRAVENOUS; SUBCUTANEOUS at 08:07

## 2024-03-08 RX ADMIN — SODIUM CHLORIDE, SODIUM LACTATE, POTASSIUM CHLORIDE, AND CALCIUM CHLORIDE: .6; .31; .03; .02 INJECTION, SOLUTION INTRAVENOUS at 09:45

## 2024-03-08 RX ADMIN — Medication 4 MCG: at 09:15

## 2024-03-08 RX ADMIN — CEFAZOLIN SODIUM 2000 MG: 2 SOLUTION INTRAVENOUS at 08:32

## 2024-03-08 RX ADMIN — SODIUM CHLORIDE, SODIUM LACTATE, POTASSIUM CHLORIDE, AND CALCIUM CHLORIDE 50 ML/HR: .6; .31; .03; .02 INJECTION, SOLUTION INTRAVENOUS at 08:07

## 2024-03-08 RX ADMIN — FENTANYL CITRATE 50 MCG: 50 INJECTION INTRAMUSCULAR; INTRAVENOUS at 08:32

## 2024-03-08 RX ADMIN — LIDOCAINE HYDROCHLORIDE 100 MG: 20 INJECTION, SOLUTION EPIDURAL; INFILTRATION; INTRACAUDAL; PERINEURAL at 08:38

## 2024-03-08 RX ADMIN — Medication 4 MCG: at 09:26

## 2024-03-08 RX ADMIN — PROPOFOL 200 MG: 10 INJECTION, EMULSION INTRAVENOUS at 08:38

## 2024-03-08 RX ADMIN — HYDROMORPHONE HYDROCHLORIDE 0.5 MG: 1 INJECTION, SOLUTION INTRAMUSCULAR; INTRAVENOUS; SUBCUTANEOUS at 09:55

## 2024-03-08 RX ADMIN — ONDANSETRON 4 MG: 2 INJECTION INTRAMUSCULAR; INTRAVENOUS at 09:30

## 2024-03-08 RX ADMIN — PROPOFOL 20 MG: 10 INJECTION, EMULSION INTRAVENOUS at 09:49

## 2024-03-08 NOTE — OP NOTE
OPERATIVE REPORT  PATIENT NAME: Philly Jeffrey    :  1975  MRN: [id number]  Pt Location: [facility] OR ROOM 01    SURGERY DATE: 3/8/2024    Surgeons and Role:     * Winter Richard MD - Primary     * Pauline Pina PA-C - Assisting    Preop Diagnosis:  Biliary dyskinesia [K82.8]    Post-Op Diagnosis Codes:     * Biliary dyskinesia [K82.8]    Procedure(s):  CHOLECYSTECTOMY LAPAROSCOPIC    Specimen(s):  ID Type Source Tests Collected by Time Destination   1 :  Tissue Gallbladder TISSUE EXAM Winter Richard MD 3/8/2024 0845      Estimated Blood Loss:   Minimal    Drains:  NG/OG/Enteral Tube Orogastric 18 Fr Center mouth (Active)   Number of days: 0     Anesthesia Type:   General  Local    Operative Indications:  Biliary dyskinesia [K82.8]  48F with chronic nausea and vague dyspepsia, HIDA with CCK consistent with biliary dyskinesia, consented for laparoscopic cholecystectomy.    Operative Findings:  -Distended, thin walled gallbladder  -Hepatomegaly  -Hepatic steatosis  -Critical view of safety achieved  -Small amount of bile spillage at the end of the gallbladder dissection immediately aspirated, then irrigated until the aspirate was clear    Complications:   None    Procedure and Technique:  The patient was taken to the operating room where she was properly identified, monitored and anesthetized. She received antibiotics and heparin prophylaxis perioperatively. Venodyne's were placed prior to the induction of anesthesia for DVT prophylaxis. The abdomen prepped and draped under sterile conditions using aseptic technique. Timeout performed. Skin incised at the umbilicus. Dissection carried down to the fascia which was elevated between Kocher  clamps and sharply incised. Peritoneal cavity entered bluntly with a Angelique clamp. 12 mm balloon trocar inserted and pneumoperitoneum established to 15 mmHg. 4 quadrants of the abdomen and inspected laparoscopically and no additional pathology was identified. 3 additional  working ports placed. These were 5 mm ports in the epigastrium and right upper quadrant. The patient placed in reverse Trendelenburg, left side down. Gallbladder grasped at its dome retracted cephalad and to the right. Infundibulum grasped and retracted laterally. [place] defined and skeletonized. Cystic duct dissected out circumferentially. Cystic artery dissected out circumferentially. Cystic artery clipped twice on the stay side, once on the specimen side and divided with scotty. Some additional venous/lymphatic branches controlled with clips on the stay side only and divided with electrocautery. Cystic duct clipped twice on the stay side, once on the specimen side and divided with scotty. Gallbladder dissected off the liver with electrocautery. At the end of the dissection there was some bile spillage from the gallbladder that was immediately aspirated. The gallbladder was placed in an Endobag and delivered through the umbilical trocar site. Pneumoperitoneum reestablished to 15 mmHg. Scope advanced and the right upper quadrant inspected. Good hemostasis found. Right upper quadrant aspirated and irrigated until the aspiration was clear.The procedure completed with removal of all ports. The fascial defect at the umbilicus closed with two figure-of-eight sutures of 0 Vicryl. Skin closed with subcuticular 4-0 Monocryl suture. Wounds infiltrated with half percent Marcaine. The wounds dressed. The patient extubated and taken to recovery in stable condition. I was present for the entire procedure and a physician assistant was required during the procedure for retraction, tissue handling, dissection and suturing.    Patient Disposition:  PACU         SIGNATURE: Winter Richard MD  DATE: March 8, 2024  TIME: 9:44 AM

## 2024-03-08 NOTE — ANESTHESIA POSTPROCEDURE EVALUATION
Post-Op Assessment Note    CV Status:  Stable    Pain management: satisfactory to patient       Mental Status:  Alert, sleepy and arousable   Hydration Status:  Euvolemic   PONV Controlled:  Controlled   Airway Patency:  Patent     Post Op Vitals Reviewed: Yes    No anethesia notable event occurred.    Staff: CRNA, Anesthesiologist               BP      Temp      Pulse     Resp   16   SpO2   100

## 2024-03-08 NOTE — H&P
Proceed to OR today for laparoscopic, possible open cholecystectomy, possible cholangiogram.      H and P- General Surgery   Philly Jeffrey 48 y.o. female MRN: [id number]  Encounter: [id number]     Assessment/Plan      48F with 3-6 months of intermittent nausea, mostly postprandial, found to have gallstones and hepatic steatosis on US without signs of inflammation. No classic signs and symptoms of biliary colic based on history and physical. HIDA with CCK showed a reduced gallbladder ejection fraction to 13% with reproduction of symptoms during the test which is consistent with biliary dyskinesia. Patient offered laparoscopic cholecystectomy for definitive management. Informed consent obtained at prior visit.        History of Present Illness             Chief Complaint   Patient presents with    Calculus of bile      3-6 months of nausea, intermittent, mostly postprandial. One episode of 5 hours of vomiting, but usually no vomiting. No diarrhea, no constipation, no bloody or black stools. No abdominal pain. Nausea is worst after eating and after eating fatty foods but it has not decreased her appetite or led her to decrease PO intake. Tried PPI for 2-3 weeks without improvement and then stopped it. Drinks a beer each day, sometimes more on the weekends, this is at the end of the day. Prior smoker, quit 15 years ago, denies heavy NSAID use. No prior abdominal surgeries. Mother has gastric polyps and gets EGD testing. Patient never had prior EGD/colonoscopy, was initially planning for screening colonoscopy this summer after turning 49.         Review of Systems   Constitutional: Negative.  Negative for activity change, appetite change and fever.   Gastrointestinal:  Positive for nausea and vomiting. Negative for abdominal distention, abdominal pain, blood in stool, constipation and diarrhea.   Genitourinary:  Negative for difficulty urinating.   All other systems reviewed and are negative.        Historical  Information         Medical History        Past Medical History:   Diagnosis Date    Breast mass 2011         Surgical History         Past Surgical History:   Procedure Laterality Date    BREAST CYST EXCISION Right 2011     benign         Social History         Social History           Substance and Sexual Activity   Alcohol Use Yes    Alcohol/week: 10.0 standard drinks of alcohol    Types: 10 Cans of beer per week      Social History          Substance and Sexual Activity   Drug Use Never      Social History           Tobacco Use   Smoking Status Former    Current packs/day: 0.00    Average packs/day: 0.5 packs/day for 16.5 years (8.3 ttl pk-yrs)    Types: Cigarettes    Start date: 1992    Quit date: 3/3/2009    Years since quittin.8   Smokeless Tobacco Never      Family History         Family History   Problem Relation Age of Onset    No Known Problems Mother      No Known Problems Father      No Known Problems Daughter      Breast cancer Maternal Grandmother 83    No Known Problems Maternal Grandfather      No Known Problems Paternal Grandmother      No Known Problems Paternal Grandfather      No Known Problems Maternal Aunt      No Known Problems Maternal Aunt      No Known Problems Maternal Aunt      No Known Problems Paternal Aunt      No Known Problems Paternal Aunt      BRCA2 Positive Neg Hx      BRCA2 Negative Neg Hx      BRCA1 Positive Neg Hx      Ovarian cancer Neg Hx      BRCA1 Negative Neg Hx      Breast cancer additional onset Neg Hx      BRCA 1/2 Neg Hx      Colon cancer Neg Hx      Endometrial cancer Neg Hx              Meds/Allergies      Current Outpatient Medications:     atorvastatin (LIPITOR) 20 mg tablet, , Disp: , Rfl:     norgestimate-ethinyl estradiol (Ortho Tri-Cyclen Lo) 0.18/0.215/0.25 MG-25 MCG per tablet, , Disp: , Rfl:   No Known Allergies     The following portions of the patient's history were reviewed and updated as appropriate: allergies, current medications, past  family history, past medical history, past social history, past surgical history, and problem list.     Objective   Current Vitals:   Vitals:    03/08/24 0759   BP: 132/80   Pulse: 68   Resp: 16   Temp: (!) 97.2 °F (36.2 °C)   SpO2: 98%          Physical Exam  Vitals reviewed.   Constitutional:       General: She is not in acute distress.     Appearance: She is not toxic-appearing.   HENT:      Head: Normocephalic.      Mouth/Throat:      Mouth: Mucous membranes are moist.   Eyes:      Pupils: Pupils are equal, round, and reactive to light.   Cardiovascular:      Rate and Rhythm: Normal rate.   Pulmonary:      Effort: Pulmonary effort is normal. No respiratory distress.   Abdominal:      General: Abdomen is flat. There is no distension.      Palpations: Abdomen is soft.      Tenderness: There is no abdominal tenderness. There is no guarding or rebound.   Musculoskeletal:         General: Normal range of motion.      Cervical back: Normal range of motion.   Skin:     General: Skin is warm.      Capillary Refill: Capillary refill takes less than 2 seconds.      Coloration: Skin is not jaundiced.   Neurological:      General: No focal deficit present.      Mental Status: She is alert.   Psychiatric:         Mood and Affect: Mood normal.

## 2024-03-08 NOTE — DISCHARGE INSTR - AVS FIRST PAGE
DISCHARGE INSTRUCTIONS:   Light activity   No heavy lifting, limit lifting to 15-20 pounds for 2 weeks   No limitations for diet   Please take medications as prescribed   Please take acetaminophen/ibuprofen scheduled every 4-6 hours for pain  Please take narcotic pain medication as needed for severe pain   Do not drive if taking narcotic pain medication     If surgical glue is over your incisions, this will peel off on its own  Please do not remove glue  prematurely   Please remove dressings 3/10/24  You may shower but do not scrub or submerge incisions    Please notify general surgery office if you experience:  Fever over 101.5F  Persistent nausea or vomiting  Severe uncontrolled pain  Redness, tenderness, or signs of infection near incisions (pain, swelling, redness, yellow/green drainage)  Active or persistent bleeding from incisions  Chest pain, shortness of breath     Please call our office with any additional questions or concerns

## 2024-03-08 NOTE — ANESTHESIA PREPROCEDURE EVALUATION
Procedure:  CHOLECYSTECTOMY LAPAROSCOPIC, POSSIBLE OPEN, POSSIBLE CHOLANGIOGRAM (Abdomen)    Relevant Problems   GI/HEPATIC   (+) Gastric ulcer   (+) Hepatic steatosis      Digestive   (+) Biliary dyskinesia      Other   (+) Obesity, Class I, BMI 30-34.9      Denies recent fever, cough or other symptom of upper respiratory tract infection.    Confirmed NPO appropriate    Physical Exam    Airway    Mallampati score: II  TM Distance: >3 FB  Neck ROM: full     Dental   No notable dental hx     Cardiovascular      Pulmonary      Other Findings  post-pubertal.      Anesthesia Plan  ASA Score- 2     Anesthesia Type- general with ASA Monitors.         Additional Monitors:     Airway Plan: ETT.           Plan Factors-Exercise tolerance (METS): >4 METS.    Chart reviewed.   Existing labs reviewed.     Patient is not a current smoker.  Patient did not smoke on day of surgery.            Induction- intravenous.    Postoperative Plan- Plan for postoperative opioid use. Planned trial extubation    Informed Consent- Anesthetic plan and risks discussed with patient.

## 2024-03-12 PROCEDURE — 88304 TISSUE EXAM BY PATHOLOGIST: CPT | Performed by: STUDENT IN AN ORGANIZED HEALTH CARE EDUCATION/TRAINING PROGRAM

## 2024-03-12 NOTE — RESULT ENCOUNTER NOTE
Gallbladder was benign with stones, no other worrisome findings which is good. Please check in regarding her recovery so far and how she is doing. Does she notice improvement in her baseline nausea since her gallbladder was removed? Please confirm her postop visit, thanks.

## 2024-03-13 ENCOUNTER — TELEPHONE (OUTPATIENT)
Age: 49
End: 2024-03-13

## 2024-03-20 DIAGNOSIS — R10.13 EPIGASTRIC PAIN: ICD-10-CM

## 2024-03-20 RX ORDER — OMEPRAZOLE 20 MG/1
40 CAPSULE, DELAYED RELEASE ORAL 2 TIMES DAILY
Qty: 360 CAPSULE | Refills: 0 | Status: CANCELLED | OUTPATIENT
Start: 2024-03-20 | End: 2024-06-18

## 2024-03-20 NOTE — TELEPHONE ENCOUNTER
Reason for call:   [x] Refill   [] Prior Auth  [] Other:     Office:   [] PCP/Provider -   [x] Specialty/Provider - Maia Willett     Medication: omeprazole (PriLOSEC) 20 mg delayed release capsule - 2 caps twice a day # 360      Pharmacy: [facility] - [place]ESEN[place] [facility] - [facility]     Does the patient have enough for 3 days?   [x] Yes   [] No - Send as HP to POD

## 2024-03-27 ENCOUNTER — OFFICE VISIT (OUTPATIENT)
Dept: SURGERY | Facility: CLINIC | Age: 49
End: 2024-03-27

## 2024-03-27 VITALS
RESPIRATION RATE: 16 BRPM | SYSTOLIC BLOOD PRESSURE: 124 MMHG | HEART RATE: 69 BPM | WEIGHT: 193.8 LBS | DIASTOLIC BLOOD PRESSURE: 78 MMHG | TEMPERATURE: 97.5 F | OXYGEN SATURATION: 99 % | HEIGHT: 62 IN | BODY MASS INDEX: 35.66 KG/M2

## 2024-03-27 DIAGNOSIS — Z90.49 S/P LAPAROSCOPIC CHOLECYSTECTOMY: Primary | ICD-10-CM

## 2024-03-27 PROCEDURE — 99024 POSTOP FOLLOW-UP VISIT: CPT | Performed by: SURGERY

## 2024-04-03 ENCOUNTER — OFFICE VISIT (OUTPATIENT)
Dept: GASTROENTEROLOGY | Facility: CLINIC | Age: 49
End: 2024-04-03
Payer: COMMERCIAL

## 2024-04-03 VITALS
TEMPERATURE: 97.5 F | OXYGEN SATURATION: 100 % | WEIGHT: 194 LBS | HEIGHT: 64 IN | DIASTOLIC BLOOD PRESSURE: 72 MMHG | BODY MASS INDEX: 33.12 KG/M2 | HEART RATE: 67 BPM | SYSTOLIC BLOOD PRESSURE: 128 MMHG

## 2024-04-03 DIAGNOSIS — K76.0 HEPATIC STEATOSIS: ICD-10-CM

## 2024-04-03 DIAGNOSIS — K80.20 CALCULUS OF GALLBLADDER WITHOUT CHOLECYSTITIS WITHOUT OBSTRUCTION: ICD-10-CM

## 2024-04-03 DIAGNOSIS — Z86.010 PERSONAL HISTORY OF COLONIC POLYPS: ICD-10-CM

## 2024-04-03 DIAGNOSIS — K25.9 GASTRIC ULCER WITHOUT HEMORRHAGE OR PERFORATION, UNSPECIFIED CHRONICITY: ICD-10-CM

## 2024-04-03 DIAGNOSIS — R10.13 EPIGASTRIC PAIN: ICD-10-CM

## 2024-04-03 DIAGNOSIS — R11.0 NAUSEA: Primary | ICD-10-CM

## 2024-04-03 PROCEDURE — 99214 OFFICE O/P EST MOD 30 MIN: CPT | Performed by: PHYSICIAN ASSISTANT

## 2024-04-03 RX ORDER — FAMOTIDINE 20 MG/1
20 TABLET, FILM COATED ORAL 2 TIMES DAILY
Qty: 180 TABLET | Refills: 3 | Status: SHIPPED | OUTPATIENT
Start: 2024-04-03 | End: 2025-03-29

## 2024-04-03 RX ORDER — OMEPRAZOLE 20 MG/1
40 CAPSULE, DELAYED RELEASE ORAL 2 TIMES DAILY
Qty: 120 CAPSULE | Refills: 1 | Status: SHIPPED | OUTPATIENT
Start: 2024-04-03 | End: 2024-06-02

## 2024-04-03 NOTE — PROGRESS NOTES
[facility] - Outpatient Follow-up Note  Philly Jeffrey 48 y.o. female MRN: [id number]  Encounter: [id number]    ASSESSMENT AND PLAN:      1. Nausea  2. Epigastric pain  3. Gastric ulcer without hemorrhage or perforation, unspecified chronicity    Pt with hx of GERD, nausea, EGD from 01/2024 with single clean based ulcer in gastric antrum. Given small size, low risk features, negative bx, reviewed with endoscopist no need to repeat EGD unless pt's symptoms persist. Given resolution of symptoms, we will defer for now.     Reviewed with pt given current asymptomatic state, we will gradually taper pt off PPI.   She can use lowest effective dose of H2RA for control of dyspeptic symptoms should they recur.     - omeprazole (PriLOSEC) 20 mg delayed release capsule; Take 2 capsules (40 mg total) by mouth 2 (two) times a day  Dispense: 120 capsule; Refill: 1  - famotidine (PEPCID) 20 mg tablet; Take 1 tablet (20 mg total) by mouth 2 (two) times a day  Dispense: 180 tablet; Refill: 3    4. Calculus of gallbladder without cholecystitis without obstruction    Hx of cholelithiasis, pt underwent cholecystectomy in 02/2024.   Feels upper GI symptoms she previously endorsed have resolved following gallbladder removal.   Continue to monitor for recurrence of symptoms at this time.     5. Hepatic steatosis    Pt with hx of hepatic steatosis. Suspect combination of metabolic/NAFLD and etoh intake, the latter of which she has cut back on. Recommend elastography now.     Discussed recommendations in regards to fatty liver including:   Strict control of contributing comorbidities (obesity, prediabetes/diabetes, hypertension, and hypertriglyceridemia).  Weight loss of approx 10-15% of patient's current body weight over a period of 6-12 months through low fat diet and cardiovascular exercise as tolerated.   Limiting alcohol consumption, preferably complete abstinence.  Monitor hepatic function every 6  months with routine labs.     - US elastography/UGAP; Future  - Hepatic function panel; Future    6. Personal history of colonic polyps    Pt with several adenomatous polyps removed during colonoscopy in 01/2024.   Recall in 3 years for surveillance purposes, taking her to 01/2027.     We will follow up as needed for development of new GI symptoms.   ______________________________________________________________________    SUBJECTIVE: Patient is a 48 y.o. female who presents today for follow-up regarding gastric ulcer. Pmhx sig for GERD, hepatic steatosis, HLD. Hx of cholecystectomy.     Pt was initially evaluated in 01/2024. At that time, she was complaining of frequent nausea, worsening with fatty foods. Underwent EGD which demonstrated sliding hiatal hernia, clean based ulcer in gastric antrum. Bx unremarkable. She had US which demonstrated cholelithiasis, and HIDA with EF 13%. She underwent cholecystectomy in 03/2024. Path was benign.     04/03/24:    Pt shares she is feeling much better since last OV. Her nausea has abated. She denies any heartburn, emesis, dysphagia or odynophagia.     Pertaining to her bowels, pt shares her bowels have been more regular since gallbladder removal, at times loose. No diarrhea, constipation. No BRBPR or melena. No nocturnal BM. No abd pain or rectal pain related to defecation.     NSAIDs: none   Etoh: previously 1-2 euceda lite daily, cut back   Tobacco: none     02/2024: HIDA 13%  12/2023: RUQ US: Cholelithiasis. No evidence of acute cholecystitis. Borderline hepatomegaly with diffuse fatty infiltration.  12/2023: Hb 12.7, , Plt 293, BUN 12, Cr 0.77, AST 15, ALT 13, ALP 55, albumin 3.7, t bili 0.70     Endoscopic history:   EGD: 01/2024: 1 cm type I hiatal hernia, regular appearing Z-line; Single superficial, benign-appearing ulcer in the antrum with clean base (Vasu III); Localized erythematous mucosa with erosion in the antrum, prepyloric region and pylorus   A.  STOMACH, RANDOM BIOPSIES: Fragments of corpus and antral-type mucosa with chronic inactive gastritis. No H. pylori organisms identified on immunohistochemistry.  Negative for granulomata, intestinal metaplasia, dysplasia or malignancy   Colon: 01/2024: Two adenomatous-appearing polyps measuring 5-9 mm in the cecum; One adenomatous-appearing polyp measuring 5-9 mm in the ascending colon; One 11 mm adenomatous-appearing polyp in the sigmoid colon  B. CECUM, POLYPS (2): Fragments of tubulovillous adenoma(s). Fragments of tubular adenoma(s). Negative for high-grade dysplasia.    C. LEFT COLON, POLYP: Hyperplastic polyp.    D. SIGMOID COLON, POLYP:Tubulovillous adenoma. Negative for high-grade dysplasia    Review of Systems   Otherwise Per HPI    Historical Information   Past Medical History:   Diagnosis Date    Breast mass 7/17/2011    Colon polyp     Gastric ulcer     Hyperlipidemia     Vaginal delivery      Past Surgical History:   Procedure Laterality Date    BREAST CYST EXCISION Right 08/2011    benign    COLONOSCOPY      AL LAPAROSCOPY SURG CHOLECYSTECTOMY N/A 3/8/2024    Procedure: CHOLECYSTECTOMY LAPAROSCOPIC, POSSIBLE OPEN, POSSIBLE CHOLANGIOGRAM;  Surgeon: Winter Richard MD;  Location: [facility];  Service: General    TONSILLECTOMY      WISDOM TOOTH EXTRACTION       Social History   Social History     Substance and Sexual Activity   Alcohol Use Yes    Alcohol/week: 10.0 standard drinks of alcohol    Types: 10 Cans of beer per week    Comment: SOCIAL     Social History     Substance and Sexual Activity   Drug Use Never     Social History     Tobacco Use   Smoking Status Former    Current packs/day: 0.00    Average packs/day: 0.5 packs/day for 16.5 years (8.3 ttl pk-yrs)    Types: Cigarettes    Start date: 9/1/1992    Quit date: 3/3/2009    Years since quitting: 15.0   Smokeless Tobacco Never     Family History   Problem Relation Age of Onset    No Known Problems Mother     No Known Problems Father     No  "Known Problems Daughter     Breast cancer Maternal Grandmother 83    No Known Problems Maternal Grandfather     No Known Problems Paternal Grandmother     No Known Problems Paternal Grandfather     No Known Problems Maternal Aunt     No Known Problems Maternal Aunt     No Known Problems Maternal Aunt     No Known Problems Paternal Aunt     No Known Problems Paternal Aunt     BRCA2 Positive Neg Hx     BRCA2 Negative Neg Hx     BRCA1 Positive Neg Hx     Ovarian cancer Neg Hx     BRCA1 Negative Neg Hx     Breast cancer additional onset Neg Hx     BRCA 1/2 Neg Hx     Colon cancer Neg Hx     Endometrial cancer Neg Hx        Meds/Allergies       Current Outpatient Medications:     atorvastatin (LIPITOR) 20 mg tablet    Bioflavonoid Products (Vitamin C) CHEW    famotidine (PEPCID) 20 mg tablet    norgestimate-ethinyl estradiol (Ortho Tri-Cyclen Lo) 0.18/0.215/0.25 MG-25 MCG per tablet    Vitamin D, Cholecalciferol, 25 MCG (1000 UT) CAPS    Vitamin E 100 units TABS    omeprazole (PriLOSEC) 20 mg delayed release capsule    traMADol (Ultram) 50 mg tablet    No Known Allergies    Objective     Blood pressure 128/72, pulse 67, temperature 97.5 °F (36.4 °C), temperature source Temporal, height 5' 4\" (1.626 m), weight 88 kg (194 lb), last menstrual period 02/16/2024, SpO2 100%. Body mass index is 33.3 kg/m².    Physical Exam  Vitals and nursing note reviewed.   Constitutional:       General: She is not in acute distress.     Appearance: She is well-developed.   HENT:      Head: Normocephalic and atraumatic.   Eyes:      Conjunctiva/sclera: Conjunctivae normal.   Cardiovascular:      Rate and Rhythm: Normal rate.   Pulmonary:      Effort: Pulmonary effort is normal. No respiratory distress.   Abdominal:      General: Bowel sounds are normal. There is no distension.      Palpations: Abdomen is soft. There is no mass.      Tenderness: There is no abdominal tenderness. There is no guarding or rebound.   Skin:     General: Skin is " warm and dry.      Coloration: Skin is not jaundiced.   Neurological:      General: No focal deficit present.      Mental Status: She is alert and oriented to person, place, and time.   Psychiatric:         Mood and Affect: Mood normal.         Behavior: Behavior normal.       Lab Results:   No visits with results within 1 Day(s) from this visit.   Latest known visit with results is:   Admission on 03/08/2024, Discharged on 03/08/2024   Component Date Value    EXT Preg Test, Ur 03/08/2024 Negative     Control 03/08/2024 Valid     Case Report 03/08/2024                      Value:Surgical Pathology Report                         Case: [id number]                                  Authorizing Provider:  Winter Richard MD       Collected:           03/08/2024 0845              Ordering Location:     [facility] Received:            03/08/2024 1029                                     Operating Room                                                               Pathologist:           Mata Rajan MD                                                          Specimen:    Gallbladder                                                                                Final Diagnosis 03/08/2024                      Value:This result contains rich text formatting which cannot be displayed here.    Additional Information 03/08/2024                      Value:This result contains rich text formatting which cannot be displayed here.    Gross Description 03/08/2024                      Value:This result contains rich text formatting which cannot be displayed here.       Radiology Results:   No results found.    Renee Bear PA-C    **Please note:  Dictation voice to text software may have been used in the creation of this record.  Occasional wrong word or “sound alike” substitutions may have occurred due to the inherent limitations of voice recognition software.  Read the chart carefully and recognize, using  context, where substitutions have occurred.**

## 2024-04-03 NOTE — PATIENT INSTRUCTIONS
Start to cut back on the omeprazole, start by cutting back to 20 mg twice daily, then 20 mg once daily, then try discontinuing completely.  You can do this over a week.  To see if gradual weaning helped you to maintain a symptom-free status.  You can even cut the famotidine back to once daily if you would like to see how you feel.  Continue with small frequent meals and avoiding saturated fats and greasy oily fried foods.  You will need another colonoscopy in 3 years.  For fatty liver we will check an elastography now as well as some blood work.

## 2024-04-06 ENCOUNTER — APPOINTMENT (OUTPATIENT)
Dept: LAB | Facility: HOSPITAL | Age: 49
End: 2024-04-06
Payer: COMMERCIAL

## 2024-04-06 DIAGNOSIS — K76.0 HEPATIC STEATOSIS: ICD-10-CM

## 2024-04-06 DIAGNOSIS — R11.0 NAUSEA: ICD-10-CM

## 2024-04-06 LAB
ALBUMIN SERPL BCP-MCNC: 4 G/DL (ref 3.5–5)
ALP SERPL-CCNC: 61 U/L (ref 34–104)
ALT SERPL W P-5'-P-CCNC: 13 U/L (ref 7–52)
AST SERPL W P-5'-P-CCNC: 15 U/L (ref 13–39)
BILIRUB DIRECT SERPL-MCNC: 0.15 MG/DL (ref 0–0.2)
BILIRUB SERPL-MCNC: 0.58 MG/DL (ref 0.2–1)
IGA SERPL-MCNC: 93 MG/DL (ref 66–433)
PROT SERPL-MCNC: 7.1 G/DL (ref 6.4–8.4)
TSH SERPL DL<=0.05 MIU/L-ACNC: 2.77 UIU/ML (ref 0.45–4.5)

## 2024-04-06 PROCEDURE — 36415 COLL VENOUS BLD VENIPUNCTURE: CPT

## 2024-04-06 PROCEDURE — 82784 ASSAY IGA/IGD/IGG/IGM EACH: CPT

## 2024-04-06 PROCEDURE — 87340 HEPATITIS B SURFACE AG IA: CPT

## 2024-04-06 PROCEDURE — 86708 HEPATITIS A ANTIBODY: CPT

## 2024-04-06 PROCEDURE — 80076 HEPATIC FUNCTION PANEL: CPT

## 2024-04-06 PROCEDURE — 86706 HEP B SURFACE ANTIBODY: CPT

## 2024-04-06 PROCEDURE — 86364 TISS TRNSGLTMNASE EA IG CLAS: CPT

## 2024-04-06 PROCEDURE — 86704 HEP B CORE ANTIBODY TOTAL: CPT

## 2024-04-06 PROCEDURE — 84443 ASSAY THYROID STIM HORMONE: CPT

## 2024-04-06 PROCEDURE — 86705 HEP B CORE ANTIBODY IGM: CPT

## 2024-04-06 PROCEDURE — 86803 HEPATITIS C AB TEST: CPT

## 2024-04-07 LAB
HAV AB SER QL IA: NORMAL
HBV CORE AB SER QL: NORMAL
HBV CORE IGM SER QL: NORMAL
HBV SURFACE AB SER-ACNC: <3 MIU/ML
HBV SURFACE AG SER QL: NORMAL
HCV AB SER QL: NORMAL

## 2024-04-08 LAB — TTG IGA SER-ACNC: <2 U/ML (ref 0–3)

## 2024-04-10 ENCOUNTER — HOSPITAL ENCOUNTER (OUTPATIENT)
Dept: ULTRASOUND IMAGING | Facility: HOSPITAL | Age: 49
Discharge: HOME | End: 2024-04-10
Payer: COMMERCIAL

## 2024-04-10 DIAGNOSIS — K76.0 HEPATIC STEATOSIS: ICD-10-CM

## 2024-04-10 PROCEDURE — 76981 USE PARENCHYMA: CPT

## 2024-04-18 PROBLEM — K82.8 BILIARY DYSKINESIA: Status: RESOLVED | Noted: 2024-02-21 | Resolved: 2024-04-18

## 2024-04-18 PROBLEM — Z90.49 S/P LAPAROSCOPIC CHOLECYSTECTOMY: Status: ACTIVE | Noted: 2024-04-18

## 2024-04-18 PROBLEM — R11.0 NAUSEA: Status: RESOLVED | Noted: 2024-01-11 | Resolved: 2024-04-18

## 2024-04-18 PROBLEM — K80.20 GALLSTONE: Status: RESOLVED | Noted: 2024-01-11 | Resolved: 2024-04-18

## 2024-04-18 NOTE — PROGRESS NOTES
"Post-Op Note - General Surgery   Philly Jeffrey 48 y.o. female MRN: [id number]  Encounter: [id number]    Assessment/Plan     48F 2 weeks status post laparoscopic cholecystectomy for biliary dyskinesia, doing very well. Pathology benign. Nausea and bloating and other symptoms of biliary dyskinesia are entirely resolved, recovering without issue, incisions clean without infection. She will continue medical management of her previously diagnosed gastritis and ulcer and she will follow with GI as well. She can otherwise follow up with our surgical team on an as needed basis.    Subjective      Chief Complaint   Patient presents with    Post-op     Doing well. Nausea she experienced preop as her driving concern is now resolved. No wound infections, eating well, no concerns today. Continues on PPI/H2B. Pathology benign.       Review of Systems   Constitutional:  Positive for activity change. Negative for fever.   Gastrointestinal: Negative.    Skin:  Positive for wound. Negative for color change.   All other systems reviewed and are negative.      The following portions of the patient's history were reviewed and updated as appropriate: allergies, current medications, past family history, past medical history, past social history, past surgical history, and problem list.    Objective      Blood pressure 124/78, pulse 69, temperature 97.5 °F (36.4 °C), temperature source Temporal, resp. rate 16, height 5' 2\" (1.575 m), weight 87.9 kg (193 lb 12.8 oz), last menstrual period 02/16/2024, SpO2 99%.   Physical Exam  Vitals reviewed.   Constitutional:       General: She is not in acute distress.     Appearance: She is not toxic-appearing.   HENT:      Head: Normocephalic.      Mouth/Throat:      Mouth: Mucous membranes are moist.   Eyes:      Pupils: Pupils are equal, round, and reactive to light.   Cardiovascular:      Rate and Rhythm: Normal rate.   Pulmonary:      Effort: Pulmonary effort is normal.   Abdominal:      " General: Abdomen is flat. There is no distension.      Palpations: Abdomen is soft. There is no mass.      Tenderness: There is no abdominal tenderness. There is no guarding or rebound.      Comments: Incisions healing well, no signs of infection   Musculoskeletal:         General: Normal range of motion.      Cervical back: Normal range of motion.   Skin:     General: Skin is warm.      Capillary Refill: Capillary refill takes less than 2 seconds.      Coloration: Skin is not jaundiced.   Neurological:      General: No focal deficit present.      Mental Status: She is alert.   Psychiatric:         Mood and Affect: Mood normal.         Signature:  Winter Richard MD  Date: 4/18/2024 Time: 9:10 AM

## 2024-05-31 DIAGNOSIS — R10.13 EPIGASTRIC PAIN: ICD-10-CM

## 2024-05-31 RX ORDER — OMEPRAZOLE 20 MG/1
40 CAPSULE, DELAYED RELEASE ORAL 2 TIMES DAILY
Qty: 120 CAPSULE | Refills: 5 | Status: SHIPPED | OUTPATIENT
Start: 2024-05-31

## 2024-12-14 ENCOUNTER — APPOINTMENT (OUTPATIENT)
Dept: LAB | Facility: HOSPITAL | Age: 49
End: 2024-12-14
Payer: COMMERCIAL

## 2024-12-14 DIAGNOSIS — E78.5 HYPERLIPIDEMIA, UNSPECIFIED HYPERLIPIDEMIA TYPE: ICD-10-CM

## 2024-12-14 LAB
ALBUMIN SERPL BCG-MCNC: 3.6 G/DL (ref 3.5–5)
ALP SERPL-CCNC: 49 U/L (ref 34–104)
ALT SERPL W P-5'-P-CCNC: 11 U/L (ref 7–52)
ANION GAP SERPL CALCULATED.3IONS-SCNC: 5 MMOL/L (ref 4–13)
AST SERPL W P-5'-P-CCNC: 12 U/L (ref 13–39)
BILIRUB SERPL-MCNC: 0.7 MG/DL (ref 0.2–1)
BUN SERPL-MCNC: 12 MG/DL (ref 5–25)
CALCIUM SERPL-MCNC: 8.4 MG/DL (ref 8.4–10.2)
CHLORIDE SERPL-SCNC: 106 MMOL/L (ref 96–108)
CHOLEST SERPL-MCNC: 180 MG/DL (ref ?–200)
CO2 SERPL-SCNC: 26 MMOL/L (ref 21–32)
CREAT SERPL-MCNC: 0.72 MG/DL (ref 0.6–1.3)
ERYTHROCYTE [DISTWIDTH] IN BLOOD BY AUTOMATED COUNT: 11.5 % (ref 11.6–15.1)
GFR SERPL CREATININE-BSD FRML MDRD: 97 ML/MIN/1.73SQ M
GLUCOSE P FAST SERPL-MCNC: 85 MG/DL (ref 65–99)
HCT VFR BLD AUTO: 38.8 % (ref 34.8–46.1)
HDLC SERPL-MCNC: 68 MG/DL
HGB BLD-MCNC: 13 G/DL (ref 11.5–15.4)
LDLC SERPL CALC-MCNC: 84 MG/DL (ref 0–100)
MCH RBC QN AUTO: 34.4 PG (ref 26.8–34.3)
MCHC RBC AUTO-ENTMCNC: 33.5 G/DL (ref 31.4–37.4)
MCV RBC AUTO: 103 FL (ref 82–98)
NONHDLC SERPL-MCNC: 112 MG/DL
PLATELET # BLD AUTO: 239 THOUSANDS/UL (ref 149–390)
PMV BLD AUTO: 9 FL (ref 8.9–12.7)
POTASSIUM SERPL-SCNC: 4.1 MMOL/L (ref 3.5–5.3)
PROT SERPL-MCNC: 6.1 G/DL (ref 6.4–8.4)
RBC # BLD AUTO: 3.78 MILLION/UL (ref 3.81–5.12)
SODIUM SERPL-SCNC: 137 MMOL/L (ref 135–147)
TRIGL SERPL-MCNC: 138 MG/DL (ref ?–150)
WBC # BLD AUTO: 7.73 THOUSAND/UL (ref 4.31–10.16)

## 2024-12-14 PROCEDURE — 36415 COLL VENOUS BLD VENIPUNCTURE: CPT

## 2024-12-14 PROCEDURE — 80053 COMPREHEN METABOLIC PANEL: CPT

## 2024-12-14 PROCEDURE — 80061 LIPID PANEL: CPT

## 2024-12-14 PROCEDURE — 85027 COMPLETE CBC AUTOMATED: CPT

## 2024-12-18 ENCOUNTER — HOSPITAL ENCOUNTER (OUTPATIENT)
Dept: MAMMOGRAPHY | Facility: HOSPITAL | Age: 49
Discharge: HOME | End: 2024-12-18
Attending: SPECIALIST
Payer: COMMERCIAL

## 2024-12-18 DIAGNOSIS — Z12.31 ENCOUNTER FOR SCREENING MAMMOGRAM FOR MALIGNANT NEOPLASM OF BREAST: ICD-10-CM

## 2024-12-18 PROCEDURE — 77067 SCR MAMMO BI INCL CAD: CPT

## 2024-12-18 PROCEDURE — 77063 BREAST TOMOSYNTHESIS BI: CPT

## 2025-04-11 DIAGNOSIS — R11.0 NAUSEA: ICD-10-CM

## 2025-04-11 DIAGNOSIS — R10.13 EPIGASTRIC PAIN: ICD-10-CM

## 2025-04-11 RX ORDER — FAMOTIDINE 20 MG/1
20 TABLET, FILM COATED ORAL 2 TIMES DAILY
Qty: 180 TABLET | Refills: 3 | Status: SHIPPED | OUTPATIENT
Start: 2025-04-11

## (undated) DEVICE — 3M™ TEGADERM™ TRANSPARENT FILM DRESSING FRAME STYLE, 1624W, 2-3/8 IN X 2-3/4 IN (6 CM X 7 CM), 100/CT 4CT/CASE: Brand: 3M™ TEGADERM™

## (undated) DEVICE — PENCIL ROCKER SWITCH CAUTERY HAND CONTROL

## (undated) DEVICE — TUBE SET SMOKE EVAC PNEUMOCLEAR HIGH FLOW

## (undated) DEVICE — ADHESIVE SKIN HIGH VISCOSITY EXOFIN 1ML

## (undated) DEVICE — NEEDLE 25G X 1 1/2

## (undated) DEVICE — 2, DISPOSABLE SUCTION/IRRIGATOR WITHOUT DISPOSABLE TIP: Brand: STRYKEFLOW

## (undated) DEVICE — CHLORAPREP HI-LITE 26ML ORANGE

## (undated) DEVICE — TROCAR: Brand: KII FIOS FIRST ENTRY

## (undated) DEVICE — TROCARS: Brand: KII® BALLOON BLUNT TIP SYSTEM

## (undated) DEVICE — 4-PORT MANIFOLD: Brand: NEPTUNE 2

## (undated) DEVICE — PACK PBDS LAP CHOLE RF

## (undated) DEVICE — INTENDED FOR TISSUE SEPARATION, AND OTHER PROCEDURES THAT REQUIRE A SHARP SURGICAL BLADE TO PUNCTURE OR CUT.: Brand: BARD-PARKER ® CARBON RIB-BACK BLADES

## (undated) DEVICE — ENDOPATH XCEL BLADELESS TROCARS WITH STABILITY SLEEVES: Brand: ENDOPATH XCEL

## (undated) DEVICE — ELECTRODE LAP L HOOK E-Z CLEAN 33CM -0020

## (undated) DEVICE — GARMENT,MEDLINE,DVT,INT,CALF,FOAM,MED: Brand: MEDLINE

## (undated) DEVICE — ENDOPOUCH RETRIEVER SPECIMEN RETRIEVAL BAGS: Brand: ENDOPOUCH RETRIEVER

## (undated) DEVICE — CLIP APPLIER WITH CLIP LOGIC TECHNOLOGY: Brand: ENDO CLIP III

## (undated) DEVICE — LAPAROSCOPIC SCISSORS: Brand: EPIX LAPAROSCOPIC SCISSORS

## (undated) DEVICE — 5 MM CURVED DISSECTORS WITH MONOPOLAR CAUTERY: Brand: ENDOPATH

## (undated) DEVICE — SUT MONOCRYL 4-0 PS-2 27 IN Y426H

## (undated) DEVICE — SUT VICRYL 0 UR-6 27 IN J603H

## (undated) DEVICE — GLOVE SRG BIOGEL 7

## (undated) DEVICE — GLOVE INDICATOR PI UNDERGLOVE SZ 7 BLUE

## (undated) DEVICE — GAUZE SPONGES,8 PLY: Brand: CURITY

## (undated) DEVICE — SPONGE GAUZE 2 X 2 4PLY STRL